# Patient Record
Sex: FEMALE | Race: WHITE | NOT HISPANIC OR LATINO
[De-identification: names, ages, dates, MRNs, and addresses within clinical notes are randomized per-mention and may not be internally consistent; named-entity substitution may affect disease eponyms.]

---

## 2018-08-22 ENCOUNTER — RESULT REVIEW (OUTPATIENT)
Age: 28
End: 2018-08-22

## 2020-03-09 ENCOUNTER — EMERGENCY (EMERGENCY)
Facility: HOSPITAL | Age: 30
LOS: 1 days | Discharge: HOME | End: 2020-03-09
Attending: EMERGENCY MEDICINE | Admitting: EMERGENCY MEDICINE
Payer: MEDICAID

## 2020-03-09 ENCOUNTER — TRANSCRIPTION ENCOUNTER (OUTPATIENT)
Age: 30
End: 2020-03-09

## 2020-03-09 VITALS
HEART RATE: 103 BPM | DIASTOLIC BLOOD PRESSURE: 88 MMHG | OXYGEN SATURATION: 100 % | SYSTOLIC BLOOD PRESSURE: 139 MMHG | TEMPERATURE: 98 F | RESPIRATION RATE: 18 BRPM

## 2020-03-09 VITALS
OXYGEN SATURATION: 100 % | HEART RATE: 99 BPM | SYSTOLIC BLOOD PRESSURE: 147 MMHG | RESPIRATION RATE: 18 BRPM | DIASTOLIC BLOOD PRESSURE: 89 MMHG | TEMPERATURE: 98 F

## 2020-03-09 LAB
ALBUMIN SERPL ELPH-MCNC: 4.4 G/DL — SIGNIFICANT CHANGE UP (ref 3.3–5)
ALP SERPL-CCNC: 56 U/L — SIGNIFICANT CHANGE UP (ref 40–120)
ALT FLD-CCNC: 14 U/L — SIGNIFICANT CHANGE UP (ref 4–33)
ANION GAP SERPL CALC-SCNC: 14 MMO/L — SIGNIFICANT CHANGE UP (ref 7–14)
APPEARANCE UR: CLEAR — SIGNIFICANT CHANGE UP
AST SERPL-CCNC: 12 U/L — SIGNIFICANT CHANGE UP (ref 4–32)
BACTERIA # UR AUTO: SIGNIFICANT CHANGE UP
BASOPHILS # BLD AUTO: 0.04 K/UL — SIGNIFICANT CHANGE UP (ref 0–0.2)
BASOPHILS NFR BLD AUTO: 0.5 % — SIGNIFICANT CHANGE UP (ref 0–2)
BILIRUB SERPL-MCNC: 1.1 MG/DL — SIGNIFICANT CHANGE UP (ref 0.2–1.2)
BILIRUB UR-MCNC: NEGATIVE — SIGNIFICANT CHANGE UP
BLD GP AB SCN SERPL QL: NEGATIVE — SIGNIFICANT CHANGE UP
BLOOD UR QL VISUAL: NEGATIVE — SIGNIFICANT CHANGE UP
BUN SERPL-MCNC: 11 MG/DL — SIGNIFICANT CHANGE UP (ref 7–23)
CALCIUM SERPL-MCNC: 9.6 MG/DL — SIGNIFICANT CHANGE UP (ref 8.4–10.5)
CHLORIDE SERPL-SCNC: 101 MMOL/L — SIGNIFICANT CHANGE UP (ref 98–107)
CO2 SERPL-SCNC: 22 MMOL/L — SIGNIFICANT CHANGE UP (ref 22–31)
COLOR SPEC: SIGNIFICANT CHANGE UP
CREAT SERPL-MCNC: 0.56 MG/DL — SIGNIFICANT CHANGE UP (ref 0.5–1.3)
EOSINOPHIL # BLD AUTO: 0.44 K/UL — SIGNIFICANT CHANGE UP (ref 0–0.5)
EOSINOPHIL NFR BLD AUTO: 5.1 % — SIGNIFICANT CHANGE UP (ref 0–6)
GLUCOSE SERPL-MCNC: 104 MG/DL — HIGH (ref 70–99)
GLUCOSE UR-MCNC: NEGATIVE — SIGNIFICANT CHANGE UP
HCG SERPL-ACNC: 114.5 MIU/ML — SIGNIFICANT CHANGE UP
HCT VFR BLD CALC: 44.9 % — SIGNIFICANT CHANGE UP (ref 34.5–45)
HGB BLD-MCNC: 14.1 G/DL — SIGNIFICANT CHANGE UP (ref 11.5–15.5)
HYALINE CASTS # UR AUTO: NEGATIVE — SIGNIFICANT CHANGE UP
IMM GRANULOCYTES NFR BLD AUTO: 0.3 % — SIGNIFICANT CHANGE UP (ref 0–1.5)
KETONES UR-MCNC: NEGATIVE — SIGNIFICANT CHANGE UP
LEUKOCYTE ESTERASE UR-ACNC: SIGNIFICANT CHANGE UP
LYMPHOCYTES # BLD AUTO: 3.44 K/UL — HIGH (ref 1–3.3)
LYMPHOCYTES # BLD AUTO: 40 % — SIGNIFICANT CHANGE UP (ref 13–44)
MCHC RBC-ENTMCNC: 26.9 PG — LOW (ref 27–34)
MCHC RBC-ENTMCNC: 31.4 % — LOW (ref 32–36)
MCV RBC AUTO: 85.5 FL — SIGNIFICANT CHANGE UP (ref 80–100)
MONOCYTES # BLD AUTO: 0.62 K/UL — SIGNIFICANT CHANGE UP (ref 0–0.9)
MONOCYTES NFR BLD AUTO: 7.2 % — SIGNIFICANT CHANGE UP (ref 2–14)
NEUTROPHILS # BLD AUTO: 4.02 K/UL — SIGNIFICANT CHANGE UP (ref 1.8–7.4)
NEUTROPHILS NFR BLD AUTO: 46.9 % — SIGNIFICANT CHANGE UP (ref 43–77)
NITRITE UR-MCNC: NEGATIVE — SIGNIFICANT CHANGE UP
NRBC # FLD: 0 K/UL — SIGNIFICANT CHANGE UP (ref 0–0)
PH UR: 7 — SIGNIFICANT CHANGE UP (ref 5–8)
PLATELET # BLD AUTO: 308 K/UL — SIGNIFICANT CHANGE UP (ref 150–400)
PMV BLD: 10 FL — SIGNIFICANT CHANGE UP (ref 7–13)
POTASSIUM SERPL-MCNC: 3.9 MMOL/L — SIGNIFICANT CHANGE UP (ref 3.5–5.3)
POTASSIUM SERPL-SCNC: 3.9 MMOL/L — SIGNIFICANT CHANGE UP (ref 3.5–5.3)
PROT SERPL-MCNC: 7.5 G/DL — SIGNIFICANT CHANGE UP (ref 6–8.3)
PROT UR-MCNC: NEGATIVE — SIGNIFICANT CHANGE UP
RBC # BLD: 5.25 M/UL — HIGH (ref 3.8–5.2)
RBC # FLD: 12.4 % — SIGNIFICANT CHANGE UP (ref 10.3–14.5)
RBC CASTS # UR COMP ASSIST: SIGNIFICANT CHANGE UP (ref 0–?)
RH IG SCN BLD-IMP: POSITIVE — SIGNIFICANT CHANGE UP
SODIUM SERPL-SCNC: 137 MMOL/L — SIGNIFICANT CHANGE UP (ref 135–145)
SP GR SPEC: 1.01 — SIGNIFICANT CHANGE UP (ref 1–1.04)
SQUAMOUS # UR AUTO: SIGNIFICANT CHANGE UP
UROBILINOGEN FLD QL: NORMAL — SIGNIFICANT CHANGE UP
WBC # BLD: 8.59 K/UL — SIGNIFICANT CHANGE UP (ref 3.8–10.5)
WBC # FLD AUTO: 8.59 K/UL — SIGNIFICANT CHANGE UP (ref 3.8–10.5)
WBC UR QL: SIGNIFICANT CHANGE UP (ref 0–?)

## 2020-03-09 PROCEDURE — 99284 EMERGENCY DEPT VISIT MOD MDM: CPT

## 2020-03-09 PROCEDURE — 76830 TRANSVAGINAL US NON-OB: CPT | Mod: 26

## 2020-03-09 RX ORDER — ACETAMINOPHEN 500 MG
650 TABLET ORAL ONCE
Refills: 0 | Status: COMPLETED | OUTPATIENT
Start: 2020-03-09 | End: 2020-03-09

## 2020-03-09 NOTE — ED PROVIDER NOTE - CLINICAL SUMMARY MEDICAL DECISION MAKING FREE TEXT BOX
Cabot: 30F with PMH of DM and PCOS, here with 2 weeks of abd bloating, nausea, and 1 episode of NBNB vomiting a few days ago.  No F/C/N/V/D/urinary sx/vaginal bleeding or discharge.  LMP early Feb. Normale exam.  Upreg positive.  Early preg vs ectopic.  Will check labs, b-hcg, TVUS, UA, reassess.

## 2020-03-09 NOTE — ED PROVIDER NOTE - OBJECTIVE STATEMENT
Cabot: 30F with PMH of DM and PCOS, here with 2 weeks of abd bloating, nausea, and 1 episode of NBNB vomiting a few days ago.  No F/C/N/V/D/urinary sx/vaginal bleeding or discharge.  LMP early Feb.

## 2020-03-09 NOTE — ED PROVIDER NOTE - RAPID ASSESSMENT
29 y/o F presents to ED with bloating and nausea since 2 weeks. Symptoms are constant and worsening. Reports 1 episode of vomiting several days ago, but has tolerate PO since then. Denies having any fever, chills, headache, chest pain, SOB, diarrhea, dysuria, and sick contacts. LMP early February and patient has had unprotected sex x1.   PE: GEN - NAD; well appearing; A+O x3, anxious appearing   HEAD - NC/AT   EYES- PERRL, EOMI  ENT: Airway patent, mmm, Oral cavity and pharynx normal. No inflammation, swelling, exudate, or lesions.  NECK: Neck supple, non-tender without lymphadenopathy, no masses.  PULMONARY - CTA b/l, symmetric breath sounds.   CARDIAC -s1s2, RRR, no M,G,R  ABDOMEN - +BS, Distension ?, NT, soft, no guarding, no rebound, no masses   BACK - no CVA tenderness, Normal  spine   EXTREMITIES - FROM, symmetric pulses, capillary refill < 2 seconds, no edema   SKIN - no rash or bruising   Initial face to face assessment completed by . Pregnancy test positive in ED, will check labs and US . Will hand off patient to intake providers for further evaluation and management. MD Guerrero: I briefly evaluated the patient in quick look triage as per [facility] ED procedure.   I have placed initial orders to help expedite the care.  I have handed off the care of this patient to the accepting ED provider team and signed out my initial brief assessment and orders to the team. The patient is to be fully evaluated by the intake providers and have orders confirmed/changed/added to as they see fit.  31 y/o F presents to ED with bloating and nausea since 2 weeks. Symptoms are constant and worsening. Reports 1 episode of vomiting several days ago, but has tolerate PO since then. Denies having any fever, chills, headache, chest pain, SOB, diarrhea, dysuria, and sick contacts. LMP early February and patient has had unprotected sex x1.   PE: GEN - NAD; well appearing; A+O x3, anxious appearing   HEAD - NC/AT   EYES- PERRL, EOMI  ENT: Airway patent, mmm, Oral cavity and pharynx normal. No inflammation, swelling, exudate, or lesions.  NECK: Neck supple, non-tender without lymphadenopathy, no masses.  PULMONARY - CTA b/l, symmetric breath sounds.   CARDIAC -s1s2, RRR, no M,G,R  ABDOMEN - +BS, ?mild distention, NT, soft, no guarding, no rebound, no masses   BACK - no CVA tenderness, Normal  spine   EXTREMITIES - FROM, symmetric pulses, capillary refill < 2 seconds, no edema   SKIN - no rash or bruising   Initial face to face assessment completed by . Pregnancy test positive in ED, will check labs and US . Will hand off patient to intake providers for further evaluation and management.

## 2020-03-09 NOTE — CONSULT NOTE ADULT - SUBJECTIVE AND OBJECTIVE BOX
GARO MCCULLOUGH  30y  Female [id number]    HPI: 29yo  LMP 2/8 p/w bloating x 1-2 weeks. Patient has female partner but had intercourse with a male  to try to conceive. She denies any abdominal pain, or VB. She has no complaints other than feeling bloated. Periods are irregular due to h.o PCOS. Denies any fevers, chills, N/V/D, SOB, CP, dyspnea.    Name of GYN Physician:  appt to establish care with Dr. Lazo 3/17    POB:   x1  c/b GDM    Pgyn: +PCOS. Denies fibroids, endometriosis, STI's, Abnormal pap smears     Home meds: metformin 2000mg daily, BP med (name unknown)    Allergies    cephalexin (Unknown)  oral contraceptives (Anaphylaxis)    PAST MEDICAL & SURGICAL HISTORY:  PCOS (polycystic ovarian syndrome)  Diabetes  No significant past surgical history      FAMILY HISTORY: HTN, CHF, T2DM in father, HTN in mother    Social History:  Denies current smoking use, drug use, alcohol use. H/o smoking, last cigarette 1-2 months ago    Vital Signs Last 24 Hrs  T(C): 36.8 (09 Mar 2020 16:24), Max: 36.8 (09 Mar 2020 16:24)  T(F): 98.3 (09 Mar 2020 16:24), Max: 98.3 (09 Mar 2020 16:24)  HR: 99 (09 Mar 2020 16:24) (99 - 103)  BP: 147/89 (09 Mar 2020 16:24) (139/88 - 147/89)  BP(mean): --  RR: 18 (09 Mar 2020 16:24) (18 - 18)  SpO2: 100% (09 Mar 2020 16:24) (100% - 100%)    Physical Exam:   General: sitting comftorably in bed, NAD   HEENT: neck supple, full ROM  CV: RR S1S2 no m/r/g  Lungs: CTA b/l, good air flow b/l   Back: No CVA tenderness  Abd: Soft, non-tender, non-distended.  Bowel sounds present.    :  No bleeding on pad.    External labia wnl.  Bimanual exam with no cervical motion tenderness, uterus wnl, adnexa non palpable b/l.  Cervix closed vs. Cervix dilated    cm   Speculum Exam: No active bleeding from os.  Physiologic discharge.    Ext: non-tender b/l, no edema     LABS:                              14.1   8.59  )-----------( 308      ( 09 Mar 2020 10:45 )             44.9         137  |  101  |  11  ----------------------------<  104<H>  3.9   |  22  |  0.56    Ca    9.6      09 Mar 2020 10:45    TPro  7.5  /  Alb  4.4  /  TBili  1.1  /  DBili  x   /  AST  12  /  ALT  14  /  AlkPhos  56      I&O's Detail      Urinalysis Basic - ( 09 Mar 2020 10:45 )    Color: LIGHT YELLOW / Appearance: CLEAR / S.011 / pH: 7.0  Gluc: NEGATIVE / Ketone: NEGATIVE  / Bili: NEGATIVE / Urobili: NORMAL   Blood: NEGATIVE / Protein: NEGATIVE / Nitrite: NEGATIVE   Leuk Esterase: MODERATE / RBC: 0-2 / WBC 3-5   Sq Epi: OCC / Non Sq Epi: x / Bacteria: FEW        RADIOLOGY & ADDITIONAL STUDIES:    EXAM:  US TRANSVAGINAL        PROCEDURE DATE:  Mar  9 2020         INTERPRETATION:  CLINICAL INFORMATION: Pregnant with bloating.    LMP: 2020.  Estimated Gestational Age by LMP: 4 weeks, 2 days.    COMPARISON: None available.    TECHNIQUE: Endovaginal pelvic sonogram.     FINDINGS:    Uterus: Uterus measures 7.3 x 4.5 x 6.2 cm. Endometrial stripe is thickened, measuring 1.5 cm. No intrauterine gestational sac is present, possibly due to early dates.    Right ovary: 4.2 x 2.2 x 2.2 cm. Corpus luteum, measuring 1.8 x 2.2 x 1.6 cm.  Left ovary: 2.5 x 2.0 x 1.4 cm. Within normal limits.  Free fluid: Small volume free fluid in the right adnexa and cul-de-sac.    IMPRESSION:    Positive beta hCG without intrauterine gestational sac or suspicious adnexal mass. Differential diagnosis includes early intrauterine pregnancy, occult ectopic pregnancy and completed spontaneous . Close follow-up with ultrasound and beta hCG levels is necessary to exclude ectopic pregnancy.                   SHANE PHELAN M.D., ATTENDING RADIOLOGIST  This document has been electronically signed. Mar  9 2020  4:01PM GARO MCCULLOUGH  30y  Female [id number]    HPI: 31yo  LMP 2/8 p/w bloating x 1-2 weeks. Patient has female partner but had intercourse with a male  to try to conceive. She denies any abdominal pain, or VB. She has no complaints other than feeling bloated. Periods are irregular due to h.o PCOS. Denies any fevers, chills, N/V/D, SOB, CP, dyspnea.    Name of GYN Physician:  appt to establish care with Dr. Lazo 3/17    POB:   x1  c/b GDM    Pgyn: +PCOS. Denies fibroids, endometriosis, STI's, Abnormal pap smears     Home meds: metformin 2000mg daily  Allergies    cephalexin (Unknown)  oral contraceptives (Anaphylaxis)    PAST MEDICAL & SURGICAL HISTORY:  PCOS (polycystic ovarian syndrome)  Diabetes  No significant past surgical history      FAMILY HISTORY: HTN, CHF, T2DM in father, HTN in mother    Social History:  Denies current smoking use, drug use, alcohol use. H/o smoking, last cigarette 1-2 months ago    Vital Signs Last 24 Hrs  T(C): 36.8 (09 Mar 2020 16:24), Max: 36.8 (09 Mar 2020 16:24)  T(F): 98.3 (09 Mar 2020 16:24), Max: 98.3 (09 Mar 2020 16:24)  HR: 99 (09 Mar 2020 16:24) (99 - 103)  BP: 147/89 (09 Mar 2020 16:24) (139/88 - 147/89)  BP(mean): --  RR: 18 (09 Mar 2020 16:24) (18 - 18)  SpO2: 100% (09 Mar 2020 16:24) (100% - 100%)    Physical Exam:   General: sitting comftorably in bed, NAD   HEENT: neck supple, full ROM  CV: RR S1S2 no m/r/g  Lungs: CTA b/l, good air flow b/l   Back: No CVA tenderness  Abd: Soft, non-tender, non-distended.  Bowel sounds present.    :  No bleeding on pad.    External labia wnl.  Bimanual exam with no cervical motion tenderness, uterus wnl, adnexa non palpable b/l.  Cervix closed vs. Cervix dilated    cm   Speculum Exam: No active bleeding from os.  Physiologic discharge.    Ext: non-tender b/l, no edema     LABS:                              14.1   8.59  )-----------( 308      ( 09 Mar 2020 10:45 )             44.9     -    137  |  101  |  11  ----------------------------<  104<H>  3.9   |  22  |  0.56    Ca    9.6      09 Mar 2020 10:45    TPro  7.5  /  Alb  4.4  /  TBili  1.1  /  DBili  x   /  AST  12  /  ALT  14  /  AlkPhos  56      I&O's Detail      Urinalysis Basic - ( 09 Mar 2020 10:45 )    Color: LIGHT YELLOW / Appearance: CLEAR / S.011 / pH: 7.0  Gluc: NEGATIVE / Ketone: NEGATIVE  / Bili: NEGATIVE / Urobili: NORMAL   Blood: NEGATIVE / Protein: NEGATIVE / Nitrite: NEGATIVE   Leuk Esterase: MODERATE / RBC: 0-2 / WBC 3-5   Sq Epi: OCC / Non Sq Epi: x / Bacteria: FEW        RADIOLOGY & ADDITIONAL STUDIES:    EXAM:  US TRANSVAGINAL        PROCEDURE DATE:  Mar  9 2020         INTERPRETATION:  CLINICAL INFORMATION: Pregnant with bloating.    LMP: 2020.  Estimated Gestational Age by LMP: 4 weeks, 2 days.    COMPARISON: None available.    TECHNIQUE: Endovaginal pelvic sonogram.     FINDINGS:    Uterus: Uterus measures 7.3 x 4.5 x 6.2 cm. Endometrial stripe is thickened, measuring 1.5 cm. No intrauterine gestational sac is present, possibly due to early dates.    Right ovary: 4.2 x 2.2 x 2.2 cm. Corpus luteum, measuring 1.8 x 2.2 x 1.6 cm.  Left ovary: 2.5 x 2.0 x 1.4 cm. Within normal limits.  Free fluid: Small volume free fluid in the right adnexa and cul-de-sac.    IMPRESSION:    Positive beta hCG without intrauterine gestational sac or suspicious adnexal mass. Differential diagnosis includes early intrauterine pregnancy, occult ectopic pregnancy and completed spontaneous . Close follow-up with ultrasound and beta hCG levels is necessary to exclude ectopic pregnancy.                   SHANE PHELAN M.D., ATTENDING RADIOLOGIST  This document has been electronically signed. Mar  9 2020  4:01PM

## 2020-03-09 NOTE — CONSULT NOTE ADULT - ASSESSMENT
A/P: 29yo  LMP 2/8 p/w bloating, found to have bHCG 114.5. TVUS revealed mild free fluid and R CL cyst. Physical exam asymptomatic    - T+S pending  - repeat bHCG in 48h  - Dr. Lazo aware of patient; patient instructed to contact his office for an earlier visit this Wed 3/11  - if patient unable to be seen by Dr. Lazo, return to ED for repeat beta  - ectopic precautions given    YESSI Cr PGY2  seen with Dr. Goss

## 2020-03-09 NOTE — ED PROVIDER NOTE - NSFOLLOWUPINSTRUCTIONS_ED_ALL_ED_FT
You have been seen for pregnancy.  We did not see a pregnancy in the uterus, so it is EITHER too early on in the pregnancy, OR the pregnancy is in the wrong place.  It is VERY important that you have another ultrasound and blood test (beta-hcg) in 48 hours, on Wednesday, March 11.  Please call Dr. Zack Lazo TODAY to make an appointment for Wednesday.  If you are unable to make an appointment, please come back to the ED for the testing.  Come back to the ED before that if you develop pain, vaginal bleeding, or other concerns.

## 2020-03-09 NOTE — ED ADULT NURSE NOTE - OBJECTIVE STATEMENT
Pt recd in intake, c/o 2 weeks of abd pain and nausea. Pt had a positive UCG in ED. Pt c/o nausea and states she doesn't want the Tylenol, but also doesn't want nausea medications because she had a bad reaction to it in the past, but unsure which medication.

## 2020-03-09 NOTE — ED PROVIDER NOTE - NS_ ATTENDINGSCRIBEDETAILS _ED_A_ED_FT
The ratnaibkayy's documentation of the Rapid Assessment Note has been prepared under my (Dr. Guerrero) direction after being performed by myself.  I performed an initial evaluation of this patient on my own so as to begin their care.  The care of this patient included a secondary attending only after my Rapid Assessment was performed by myself.  Following my Rapid Assessment, the care of this patient was signed out to a secondary attending whose attestation is separate from mine.  I have personally reviewed the portions of the chart relevant to my initial evaluation.  I agree with the ratnaibkayy's documentation of the Rapid Assessment unless otherwise noted here in the chart.

## 2020-03-09 NOTE — ED PROVIDER NOTE - PATIENT PORTAL LINK FT
You can access the FollowMyHealth Patient Portal offered by [facility] by registering at the following website: [email or web address]. By joining [email or web address]’s FollowMyHealth portal, you will also be able to view your health information using other applications (apps) compatible with our system.

## 2020-03-11 NOTE — CHART NOTE - NSCHARTNOTEFT_GEN_A_CORE
Patient called and voicemail left instructing patient to follow up in the ED for repeat 48h bHCG tonight. She was initially found to have beta  of 114.5 on 3/11 after coming to the ED with a +HPT.  Contact information included in kristian.    YESSI Cr PGY2

## 2020-03-12 NOTE — CHART NOTE - NSCHARTNOTEFT_GEN_A_CORE
R1 communication note    Called and left voicemail reminding patient to follow up with her private OB or come to the ED for repeat bHCG. Return precautions reviewed. Contact phone number for the clinic was provided. Will continue to try and contact patient.    Rfrankel PGY1

## 2020-03-24 PROBLEM — Z00.00 ENCOUNTER FOR PREVENTIVE HEALTH EXAMINATION: Status: ACTIVE | Noted: 2020-03-24

## 2020-04-18 PROBLEM — E28.2 POLYCYSTIC OVARIAN SYNDROME: Chronic | Status: ACTIVE | Noted: 2020-03-09

## 2020-04-19 ENCOUNTER — TRANSCRIPTION ENCOUNTER (OUTPATIENT)
Age: 30
End: 2020-04-19

## 2020-04-20 ENCOUNTER — RESULT REVIEW (OUTPATIENT)
Age: 30
End: 2020-04-20

## 2020-04-20 ENCOUNTER — OUTPATIENT (OUTPATIENT)
Dept: OUTPATIENT SERVICES | Facility: HOSPITAL | Age: 30
LOS: 1 days | End: 2020-04-20
Payer: MEDICAID

## 2020-04-20 ENCOUNTER — OUTPATIENT (OUTPATIENT)
Dept: OUTPATIENT SERVICES | Facility: HOSPITAL | Age: 30
LOS: 1 days | Discharge: HOME | End: 2020-04-20
Payer: MEDICAID

## 2020-04-20 VITALS
SYSTOLIC BLOOD PRESSURE: 133 MMHG | TEMPERATURE: 99 F | DIASTOLIC BLOOD PRESSURE: 84 MMHG | HEART RATE: 100 BPM | RESPIRATION RATE: 15 BRPM | OXYGEN SATURATION: 100 %

## 2020-04-20 VITALS
WEIGHT: 179.9 LBS | RESPIRATION RATE: 14 BRPM | SYSTOLIC BLOOD PRESSURE: 128 MMHG | HEIGHT: 62 IN | DIASTOLIC BLOOD PRESSURE: 89 MMHG | TEMPERATURE: 99 F | OXYGEN SATURATION: 99 % | HEART RATE: 102 BPM

## 2020-04-20 DIAGNOSIS — O02.1 MISSED ABORTION: ICD-10-CM

## 2020-04-20 DIAGNOSIS — Z98.890 OTHER SPECIFIED POSTPROCEDURAL STATES: Chronic | ICD-10-CM

## 2020-04-20 LAB
APTT BLD: 32.4 SEC — SIGNIFICANT CHANGE UP (ref 27.5–36.3)
BASOPHILS # BLD AUTO: 0.05 K/UL — SIGNIFICANT CHANGE UP (ref 0–0.2)
BASOPHILS NFR BLD AUTO: 0.5 % — SIGNIFICANT CHANGE UP (ref 0–2)
BLD GP AB SCN SERPL QL: NEGATIVE — SIGNIFICANT CHANGE UP
EOSINOPHIL # BLD AUTO: 0.47 K/UL — SIGNIFICANT CHANGE UP (ref 0–0.5)
EOSINOPHIL NFR BLD AUTO: 5.1 % — SIGNIFICANT CHANGE UP (ref 0–6)
HCT VFR BLD CALC: 38 % — SIGNIFICANT CHANGE UP (ref 34.5–45)
HGB BLD-MCNC: 12.6 G/DL — SIGNIFICANT CHANGE UP (ref 11.5–15.5)
IMM GRANULOCYTES NFR BLD AUTO: 0.2 % — SIGNIFICANT CHANGE UP (ref 0–1.5)
INR BLD: 0.97 — SIGNIFICANT CHANGE UP (ref 0.88–1.17)
LYMPHOCYTES # BLD AUTO: 3.42 K/UL — HIGH (ref 1–3.3)
LYMPHOCYTES # BLD AUTO: 37.1 % — SIGNIFICANT CHANGE UP (ref 13–44)
MCHC RBC-ENTMCNC: 27.8 PG — SIGNIFICANT CHANGE UP (ref 27–34)
MCHC RBC-ENTMCNC: 33.2 % — SIGNIFICANT CHANGE UP (ref 32–36)
MCV RBC AUTO: 83.7 FL — SIGNIFICANT CHANGE UP (ref 80–100)
MONOCYTES # BLD AUTO: 0.63 K/UL — SIGNIFICANT CHANGE UP (ref 0–0.9)
MONOCYTES NFR BLD AUTO: 6.8 % — SIGNIFICANT CHANGE UP (ref 2–14)
NEUTROPHILS # BLD AUTO: 4.62 K/UL — SIGNIFICANT CHANGE UP (ref 1.8–7.4)
NEUTROPHILS NFR BLD AUTO: 50.3 % — SIGNIFICANT CHANGE UP (ref 43–77)
NRBC # FLD: 0 K/UL — SIGNIFICANT CHANGE UP (ref 0–0)
PLATELET # BLD AUTO: 247 K/UL — SIGNIFICANT CHANGE UP (ref 150–400)
PMV BLD: 9.4 FL — SIGNIFICANT CHANGE UP (ref 7–13)
PROTHROM AB SERPL-ACNC: 11 SEC — SIGNIFICANT CHANGE UP (ref 9.8–13.1)
RBC # BLD: 4.54 M/UL — SIGNIFICANT CHANGE UP (ref 3.8–5.2)
RBC # FLD: 12.6 % — SIGNIFICANT CHANGE UP (ref 10.3–14.5)
RH IG SCN BLD-IMP: POSITIVE — SIGNIFICANT CHANGE UP
WBC # BLD: 9.21 K/UL — SIGNIFICANT CHANGE UP (ref 3.8–10.5)
WBC # FLD AUTO: 9.21 K/UL — SIGNIFICANT CHANGE UP (ref 3.8–10.5)

## 2020-04-20 PROCEDURE — 88264 CHROMOSOME ANALYSIS 20-25: CPT

## 2020-04-20 PROCEDURE — 88233 TISSUE CULTURE SKIN/BIOPSY: CPT

## 2020-04-20 PROCEDURE — 88280 CHROMOSOME KARYOTYPE STUDY: CPT

## 2020-04-20 PROCEDURE — 88305 TISSUE EXAM BY PATHOLOGIST: CPT | Mod: 26

## 2020-04-20 RX ORDER — SODIUM CHLORIDE 9 MG/ML
1000 INJECTION, SOLUTION INTRAVENOUS
Refills: 0 | Status: DISCONTINUED | OUTPATIENT
Start: 2020-04-20 | End: 2020-05-05

## 2020-04-20 NOTE — H&P PST ADULT - HISTORY OF PRESENT ILLNESS
30y  LMP 20, presents for a scheduled DVC for a missed . Patient had an US last Thursday, which demonstrated the fetus without cardiac activity, which was previously seen. She states the fetus at that time was measured to be approximately 9weeks. Denies fever/chills n/v, CP/SOB, vaginal bleeding, abdominal/pelvic pain.     COVID-19 negative on 20.     OB/GYN: Dr. Lazo

## 2020-04-20 NOTE — ASU DISCHARGE PLAN (ADULT/PEDIATRIC) - CALL YOUR DOCTOR IF YOU HAVE ANY OF THE FOLLOWING:
Fever greater than (need to indicate Fahrenheit or Celsius)/Wound/Surgical Site with redness, or foul smelling discharge or pus/Nausea and vomiting that does not stop/Unable to urinate/Inability to tolerate liquids or foods/Bleeding that does not stop/Pain not relieved by Medications

## 2020-04-20 NOTE — H&P PST ADULT - NSICDXPASTMEDICALHX_GEN_ALL_CORE_FT
PAST MEDICAL HISTORY:   (normal spontaneous vaginal delivery)     PCOS (polycystic ovarian syndrome)     Pre-diabetes

## 2020-04-20 NOTE — ASU DISCHARGE PLAN (ADULT/PEDIATRIC) - CARE PROVIDER_API CALL
Cal Lazo)  Obstetrics and Gynecology  [address] [place] [address]  Phone: [phone]  Fax: [phone]  Established Patient  Follow Up Time: 2 weeks

## 2020-04-20 NOTE — ASU DISCHARGE PLAN (ADULT/PEDIATRIC) - ACTIVITY LEVEL
Weight bearing as tolerated/No tub baths/No excercise/No douching/No heavy lifting/No sports/gym/Nothing per vagina/No tampons/2 weeks/No intercourse

## 2020-04-20 NOTE — BRIEF OPERATIVE NOTE - OPERATION/FINDINGS
EUA: anteverted uterus, no adnexal masses palpable. Excellent hemostasis noted at the end of the procedure.

## 2020-04-20 NOTE — BRIEF OPERATIVE NOTE - NSICDXBRIEFPROCEDURE_GEN_ALL_CORE_FT
PROCEDURES:  Dilation and curettage, uterus, using suction, for missed first trimester  2020 13:13:38  Ya Mobley

## 2020-04-23 ENCOUNTER — TRANSCRIPTION ENCOUNTER (OUTPATIENT)
Age: 30
End: 2020-04-23

## 2020-04-28 PROBLEM — R73.03 PREDIABETES: Chronic | Status: ACTIVE | Noted: 2020-04-20

## 2020-05-02 ENCOUNTER — APPOINTMENT (OUTPATIENT)
Dept: ANTEPARTUM | Facility: CLINIC | Age: 30
End: 2020-05-02

## 2021-08-16 ENCOUNTER — OUTPATIENT (OUTPATIENT)
Dept: OUTPATIENT SERVICES | Facility: HOSPITAL | Age: 31
LOS: 1 days | End: 2021-08-16
Payer: MEDICAID

## 2021-08-16 ENCOUNTER — APPOINTMENT (OUTPATIENT)
Dept: CT IMAGING | Facility: IMAGING CENTER | Age: 31
End: 2021-08-16
Payer: MEDICAID

## 2021-08-16 ENCOUNTER — APPOINTMENT (OUTPATIENT)
Dept: UROLOGY | Facility: CLINIC | Age: 31
End: 2021-08-16
Payer: MEDICAID

## 2021-08-16 VITALS
WEIGHT: 170 LBS | HEIGHT: 62.5 IN | BODY MASS INDEX: 30.5 KG/M2 | RESPIRATION RATE: 17 BRPM | TEMPERATURE: 98 F | DIASTOLIC BLOOD PRESSURE: 92 MMHG | HEART RATE: 103 BPM | SYSTOLIC BLOOD PRESSURE: 134 MMHG

## 2021-08-16 DIAGNOSIS — R10.9 UNSPECIFIED ABDOMINAL PAIN: ICD-10-CM

## 2021-08-16 DIAGNOSIS — Z98.890 OTHER SPECIFIED POSTPROCEDURAL STATES: Chronic | ICD-10-CM

## 2021-08-16 PROCEDURE — 99203 OFFICE O/P NEW LOW 30 MIN: CPT

## 2021-08-16 PROCEDURE — 74176 CT ABD & PELVIS W/O CONTRAST: CPT | Mod: 26

## 2021-08-16 PROCEDURE — 74176 CT ABD & PELVIS W/O CONTRAST: CPT

## 2021-08-17 DIAGNOSIS — N20.0 CALCULUS OF KIDNEY: ICD-10-CM

## 2021-08-18 ENCOUNTER — NON-APPOINTMENT (OUTPATIENT)
Age: 31
End: 2021-08-18

## 2021-08-18 LAB — BACTERIA UR CULT: ABNORMAL

## 2021-08-18 RX ORDER — AMOXICILLIN AND CLAVULANATE POTASSIUM 875; 125 MG/1; MG/1
875-125 TABLET, COATED ORAL
Qty: 10 | Refills: 0 | Status: ACTIVE | COMMUNITY
Start: 2021-08-18 | End: 1900-01-01

## 2021-08-18 RX ORDER — SULFAMETHOXAZOLE AND TRIMETHOPRIM 800; 160 MG/1; MG/1
800-160 TABLET ORAL TWICE DAILY
Qty: 10 | Refills: 0 | Status: ACTIVE | COMMUNITY
Start: 2021-08-18 | End: 1900-01-01

## 2021-08-18 NOTE — PHYSICAL EXAM
[General Appearance - Well Developed] : well developed [General Appearance - Well Nourished] : well nourished [Heart Rate And Rhythm] : Heart rate and rhythm were normal [Respiration, Rhythm And Depth] : normal respiratory rhythm and effort [] : no respiratory distress [Bowel Sounds] : normal bowel sounds [Abdomen Soft] : soft [Normal Station and Gait] : the gait and station were normal for the patient's age [Skin Color & Pigmentation] : normal skin color and pigmentation [Skin Turgor] : supple [No Focal Deficits] : no focal deficits [Not Anxious] : not anxious [Oriented To Time, Place, And Person] : oriented to person, place, and time [No Palpable Adenopathy] : no palpable adenopathy [FreeTextEntry1] : mideline sacral pain, starts in flank and extends to sacrum

## 2021-08-18 NOTE — HISTORY OF PRESENT ILLNESS
[FreeTextEntry1] : HPI:  Ms. Brower is a 30 yo F with a PMHx of PCOS and new onset nephrolithiasis with a 1.3 cm stone noted on a KUB ordered by PCP. She presents today with several month history of shock-like electric pain. Her pain is radiated from the midline down to her gluteal muscles, occasionally can radiate from flank. She recently had a miscarriage but previously has a prior child.  Currently triying to have another child. \par \par Anticoagulation: None\par All: NKDA\par Social: , not working, dental assistant, 1 son\par PMHx: PCOS and DM metformin\par FHx: kidney failure by mom, father with kidney stones\par PSHx: D+C for the miscarriage, no abdominal surgeries or back surgeries\par  [Urinary Incontinence] : no urinary incontinence [Urinary Retention] : no urinary retention [Urinary Urgency] : no urinary urgency [Urinary Frequency] : no urinary frequency

## 2021-09-09 ENCOUNTER — APPOINTMENT (OUTPATIENT)
Dept: UROLOGY | Facility: CLINIC | Age: 31
End: 2021-09-09

## 2021-09-29 DIAGNOSIS — Z01.818 ENCOUNTER FOR OTHER PREPROCEDURAL EXAMINATION: ICD-10-CM

## 2021-09-30 RX ORDER — DIAZEPAM 2 MG/1
2 TABLET ORAL
Qty: 1 | Refills: 0 | Status: ACTIVE | COMMUNITY
Start: 2021-09-29 | End: 1900-01-01

## 2021-10-10 ENCOUNTER — OUTPATIENT (OUTPATIENT)
Dept: OUTPATIENT SERVICES | Facility: HOSPITAL | Age: 31
LOS: 1 days | End: 2021-10-10
Payer: MEDICAID

## 2021-10-10 ENCOUNTER — APPOINTMENT (OUTPATIENT)
Dept: MRI IMAGING | Facility: CLINIC | Age: 31
End: 2021-10-10
Payer: MEDICAID

## 2021-10-10 DIAGNOSIS — R10.9 UNSPECIFIED ABDOMINAL PAIN: ICD-10-CM

## 2021-10-10 DIAGNOSIS — Z98.890 OTHER SPECIFIED POSTPROCEDURAL STATES: Chronic | ICD-10-CM

## 2021-10-10 PROCEDURE — 72158 MRI LUMBAR SPINE W/O & W/DYE: CPT

## 2021-10-10 PROCEDURE — A9585: CPT

## 2021-10-10 PROCEDURE — 72158 MRI LUMBAR SPINE W/O & W/DYE: CPT | Mod: 26

## 2021-10-18 ENCOUNTER — NON-APPOINTMENT (OUTPATIENT)
Age: 31
End: 2021-10-18

## 2021-10-24 ENCOUNTER — NON-APPOINTMENT (OUTPATIENT)
Age: 31
End: 2021-10-24

## 2021-10-27 ENCOUNTER — APPOINTMENT (OUTPATIENT)
Dept: ORTHOPEDIC SURGERY | Facility: CLINIC | Age: 31
End: 2021-10-27

## 2021-11-08 ENCOUNTER — OUTPATIENT (OUTPATIENT)
Dept: OUTPATIENT SERVICES | Facility: HOSPITAL | Age: 31
LOS: 1 days | End: 2021-11-08
Payer: MEDICAID

## 2021-11-08 VITALS
WEIGHT: 179.9 LBS | HEART RATE: 98 BPM | TEMPERATURE: 98 F | DIASTOLIC BLOOD PRESSURE: 88 MMHG | SYSTOLIC BLOOD PRESSURE: 133 MMHG | HEIGHT: 62.5 IN | RESPIRATION RATE: 20 BRPM | OXYGEN SATURATION: 98 %

## 2021-11-08 DIAGNOSIS — Z98.890 OTHER SPECIFIED POSTPROCEDURAL STATES: Chronic | ICD-10-CM

## 2021-11-08 DIAGNOSIS — Z01.818 ENCOUNTER FOR OTHER PREPROCEDURAL EXAMINATION: ICD-10-CM

## 2021-11-08 DIAGNOSIS — R10.9 UNSPECIFIED ABDOMINAL PAIN: ICD-10-CM

## 2021-11-08 DIAGNOSIS — N20.0 CALCULUS OF KIDNEY: ICD-10-CM

## 2021-11-08 LAB
ANION GAP SERPL CALC-SCNC: 14 MMOL/L — SIGNIFICANT CHANGE UP (ref 5–17)
BUN SERPL-MCNC: 14 MG/DL — SIGNIFICANT CHANGE UP (ref 7–23)
CALCIUM SERPL-MCNC: 9.7 MG/DL — SIGNIFICANT CHANGE UP (ref 8.4–10.5)
CHLORIDE SERPL-SCNC: 101 MMOL/L — SIGNIFICANT CHANGE UP (ref 96–108)
CO2 SERPL-SCNC: 22 MMOL/L — SIGNIFICANT CHANGE UP (ref 22–31)
CREAT SERPL-MCNC: 0.65 MG/DL — SIGNIFICANT CHANGE UP (ref 0.5–1.3)
GLUCOSE SERPL-MCNC: 105 MG/DL — HIGH (ref 70–99)
HCT VFR BLD CALC: 41.3 % — SIGNIFICANT CHANGE UP (ref 34.5–45)
HGB BLD-MCNC: 13.5 G/DL — SIGNIFICANT CHANGE UP (ref 11.5–15.5)
MCHC RBC-ENTMCNC: 27.8 PG — SIGNIFICANT CHANGE UP (ref 27–34)
MCHC RBC-ENTMCNC: 32.7 GM/DL — SIGNIFICANT CHANGE UP (ref 32–36)
MCV RBC AUTO: 85.2 FL — SIGNIFICANT CHANGE UP (ref 80–100)
NRBC # BLD: 0 /100 WBCS — SIGNIFICANT CHANGE UP (ref 0–0)
PLATELET # BLD AUTO: 253 K/UL — SIGNIFICANT CHANGE UP (ref 150–400)
POTASSIUM SERPL-MCNC: 3.7 MMOL/L — SIGNIFICANT CHANGE UP (ref 3.5–5.3)
POTASSIUM SERPL-SCNC: 3.7 MMOL/L — SIGNIFICANT CHANGE UP (ref 3.5–5.3)
RBC # BLD: 4.85 M/UL — SIGNIFICANT CHANGE UP (ref 3.8–5.2)
RBC # FLD: 12 % — SIGNIFICANT CHANGE UP (ref 10.3–14.5)
SODIUM SERPL-SCNC: 137 MMOL/L — SIGNIFICANT CHANGE UP (ref 135–145)
WBC # BLD: 10.5 K/UL — SIGNIFICANT CHANGE UP (ref 3.8–10.5)
WBC # FLD AUTO: 10.5 K/UL — SIGNIFICANT CHANGE UP (ref 3.8–10.5)

## 2021-11-08 PROCEDURE — 83036 HEMOGLOBIN GLYCOSYLATED A1C: CPT

## 2021-11-08 PROCEDURE — 85027 COMPLETE CBC AUTOMATED: CPT

## 2021-11-08 PROCEDURE — 87086 URINE CULTURE/COLONY COUNT: CPT

## 2021-11-08 PROCEDURE — 87077 CULTURE AEROBIC IDENTIFY: CPT

## 2021-11-08 PROCEDURE — 80048 BASIC METABOLIC PNL TOTAL CA: CPT

## 2021-11-08 PROCEDURE — G0463: CPT

## 2021-11-08 RX ORDER — LIDOCAINE HCL 20 MG/ML
0.2 VIAL (ML) INJECTION ONCE
Refills: 0 | Status: DISCONTINUED | OUTPATIENT
Start: 2021-11-19 | End: 2021-12-03

## 2021-11-08 RX ORDER — CIPROFLOXACIN LACTATE 400MG/40ML
400 VIAL (ML) INTRAVENOUS ONCE
Refills: 0 | Status: DISCONTINUED | OUTPATIENT
Start: 2021-11-19 | End: 2021-12-03

## 2021-11-08 RX ORDER — METFORMIN HYDROCHLORIDE 850 MG/1
2 TABLET ORAL
Qty: 0 | Refills: 0 | DISCHARGE

## 2021-11-08 RX ORDER — ACETAMINOPHEN 500 MG
3 TABLET ORAL
Qty: 0 | Refills: 0 | DISCHARGE

## 2021-11-08 RX ORDER — IBUPROFEN 200 MG
3 TABLET ORAL
Qty: 0 | Refills: 0 | DISCHARGE

## 2021-11-08 NOTE — H&P PST ADULT - NSICDXPASTSURGICALHX_GEN_ALL_CORE_FT
PAST SURGICAL HISTORY:  H/O induced      S/P dilation and curettage missed  2020     PAST SURGICAL HISTORY:  S/P dilation and curettage missed  2020

## 2021-11-08 NOTE — H&P PST ADULT - HISTORY OF PRESENT ILLNESS
31 yr old female with history of left flank pain - radiating to sacrum- shooting pain - found to have a non obstructing  9 mm kidney stone at lower pole. Now coming in for Cystoscopy, Left ureteroscopy, laser lithotripsy. Stent placement on 11/19/2021.     Denies Recent travel, Exposure or Covid symptoms  Covid vaccine 2 doses - last dose-5/2021  Covid test- 11/16/2021

## 2021-11-08 NOTE — H&P PST ADULT - ATTENDING COMMENTS
Patient seen and examined, plan for cystoscopy, left ureteroscopy, laser lithotripsy and stent placement

## 2021-11-08 NOTE — H&P PST ADULT - NSICDXPASTMEDICALHX_GEN_ALL_CORE_FT
PAST MEDICAL HISTORY:  DDD (degenerative disc disease), lumbar     PCOS (polycystic ovarian syndrome)     Pre-diabetes     Renal calculi left- non obstructing     PAST MEDICAL HISTORY:  DDD (degenerative disc disease), lumbar     Obesity     PCOS (polycystic ovarian syndrome)     Pre-diabetes     Renal calculi left- non obstructing

## 2021-11-09 LAB
A1C WITH ESTIMATED AVERAGE GLUCOSE RESULT: 5.3 % — SIGNIFICANT CHANGE UP (ref 4–5.6)
ESTIMATED AVERAGE GLUCOSE: 105 MG/DL — SIGNIFICANT CHANGE UP (ref 68–114)

## 2021-11-10 PROBLEM — M51.36 OTHER INTERVERTEBRAL DISC DEGENERATION, LUMBAR REGION: Chronic | Status: ACTIVE | Noted: 2021-11-08

## 2021-11-10 PROBLEM — E66.9 OBESITY, UNSPECIFIED: Chronic | Status: ACTIVE | Noted: 2021-11-08

## 2021-11-10 PROBLEM — N20.0 CALCULUS OF KIDNEY: Chronic | Status: ACTIVE | Noted: 2021-11-08

## 2021-11-10 LAB
CULTURE RESULTS: SIGNIFICANT CHANGE UP
SPECIMEN SOURCE: SIGNIFICANT CHANGE UP

## 2021-11-10 RX ORDER — SULFAMETHOXAZOLE AND TRIMETHOPRIM 800; 160 MG/1; MG/1
800-160 TABLET ORAL TWICE DAILY
Qty: 10 | Refills: 0 | Status: ACTIVE | COMMUNITY
Start: 2021-11-10 | End: 1900-01-01

## 2021-11-15 DIAGNOSIS — Z01.818 ENCOUNTER FOR OTHER PREPROCEDURAL EXAMINATION: ICD-10-CM

## 2021-11-16 ENCOUNTER — APPOINTMENT (OUTPATIENT)
Dept: DISASTER EMERGENCY | Facility: CLINIC | Age: 31
End: 2021-11-16

## 2021-11-17 LAB — SARS-COV-2 N GENE NPH QL NAA+PROBE: NOT DETECTED

## 2021-11-18 ENCOUNTER — TRANSCRIPTION ENCOUNTER (OUTPATIENT)
Age: 31
End: 2021-11-18

## 2021-11-19 ENCOUNTER — APPOINTMENT (OUTPATIENT)
Dept: UROLOGY | Facility: HOSPITAL | Age: 31
End: 2021-11-19

## 2021-11-19 ENCOUNTER — OUTPATIENT (OUTPATIENT)
Dept: OUTPATIENT SERVICES | Facility: HOSPITAL | Age: 31
LOS: 1 days | End: 2021-11-19
Payer: MEDICAID

## 2021-11-19 ENCOUNTER — RESULT REVIEW (OUTPATIENT)
Age: 31
End: 2021-11-19

## 2021-11-19 VITALS
SYSTOLIC BLOOD PRESSURE: 145 MMHG | HEART RATE: 100 BPM | RESPIRATION RATE: 16 BRPM | OXYGEN SATURATION: 100 % | DIASTOLIC BLOOD PRESSURE: 96 MMHG | TEMPERATURE: 97 F

## 2021-11-19 VITALS
HEART RATE: 94 BPM | WEIGHT: 179.9 LBS | SYSTOLIC BLOOD PRESSURE: 128 MMHG | DIASTOLIC BLOOD PRESSURE: 83 MMHG | TEMPERATURE: 98 F | RESPIRATION RATE: 16 BRPM | OXYGEN SATURATION: 98 % | HEIGHT: 62 IN

## 2021-11-19 DIAGNOSIS — N20.1 CALCULUS OF URETER: ICD-10-CM

## 2021-11-19 DIAGNOSIS — Z98.890 OTHER SPECIFIED POSTPROCEDURAL STATES: Chronic | ICD-10-CM

## 2021-11-19 DIAGNOSIS — R10.9 UNSPECIFIED ABDOMINAL PAIN: ICD-10-CM

## 2021-11-19 DIAGNOSIS — N20.0 CALCULUS OF KIDNEY: ICD-10-CM

## 2021-11-19 LAB — HCG UR QL: NEGATIVE — SIGNIFICANT CHANGE UP

## 2021-11-19 PROCEDURE — 88300 SURGICAL PATH GROSS: CPT | Mod: 26

## 2021-11-19 PROCEDURE — 52356 CYSTO/URETERO W/LITHOTRIPSY: CPT | Mod: LT

## 2021-11-19 PROCEDURE — 74420 UROGRAPHY RTRGR +-KUB: CPT | Mod: 26

## 2021-11-19 RX ORDER — TAMSULOSIN HYDROCHLORIDE 0.4 MG/1
0.4 CAPSULE ORAL AT BEDTIME
Refills: 0 | Status: DISCONTINUED | OUTPATIENT
Start: 2021-11-19 | End: 2021-11-19

## 2021-11-19 RX ORDER — OXYCODONE HYDROCHLORIDE 5 MG/1
10 TABLET ORAL EVERY 6 HOURS
Refills: 0 | Status: DISCONTINUED | OUTPATIENT
Start: 2021-11-19 | End: 2021-11-19

## 2021-11-19 RX ORDER — ACETAMINOPHEN 500 MG
975 TABLET ORAL EVERY 6 HOURS
Refills: 0 | Status: DISCONTINUED | OUTPATIENT
Start: 2021-11-19 | End: 2021-12-03

## 2021-11-19 RX ORDER — OXYCODONE HYDROCHLORIDE 5 MG/1
1 TABLET ORAL
Qty: 12 | Refills: 0
Start: 2021-11-19 | End: 2021-11-21

## 2021-11-19 RX ORDER — PHENAZOPYRIDINE HCL 100 MG
200 TABLET ORAL THREE TIMES A DAY
Refills: 0 | Status: DISCONTINUED | OUTPATIENT
Start: 2021-11-19 | End: 2021-12-03

## 2021-11-19 RX ORDER — ONDANSETRON 8 MG/1
4 TABLET, FILM COATED ORAL ONCE
Refills: 0 | Status: DISCONTINUED | OUTPATIENT
Start: 2021-11-19 | End: 2021-11-19

## 2021-11-19 RX ORDER — PHENAZOPYRIDINE HCL 100 MG
200 TABLET ORAL THREE TIMES A DAY
Refills: 0 | Status: DISCONTINUED | OUTPATIENT
Start: 2021-11-19 | End: 2021-11-19

## 2021-11-19 RX ORDER — TAMSULOSIN HYDROCHLORIDE 0.4 MG/1
0.4 CAPSULE ORAL ONCE
Refills: 0 | Status: COMPLETED | OUTPATIENT
Start: 2021-11-19 | End: 2021-11-19

## 2021-11-19 RX ORDER — OXYCODONE HYDROCHLORIDE 5 MG/1
5 TABLET ORAL EVERY 4 HOURS
Refills: 0 | Status: DISCONTINUED | OUTPATIENT
Start: 2021-11-19 | End: 2021-11-19

## 2021-11-19 RX ORDER — TAMSULOSIN HYDROCHLORIDE 0.4 MG/1
1 CAPSULE ORAL
Qty: 30 | Refills: 0
Start: 2021-11-19 | End: 2021-12-18

## 2021-11-19 RX ORDER — SODIUM CHLORIDE 9 MG/ML
3 INJECTION INTRAMUSCULAR; INTRAVENOUS; SUBCUTANEOUS EVERY 8 HOURS
Refills: 0 | Status: DISCONTINUED | OUTPATIENT
Start: 2021-11-19 | End: 2021-11-19

## 2021-11-19 RX ORDER — PHENAZOPYRIDINE HCL 100 MG
1 TABLET ORAL
Qty: 6 | Refills: 0
Start: 2021-11-19 | End: 2021-11-20

## 2021-11-19 RX ORDER — DIAZEPAM 5 MG
5 TABLET ORAL ONCE
Refills: 0 | Status: DISCONTINUED | OUTPATIENT
Start: 2021-11-19 | End: 2021-11-19

## 2021-11-19 RX ORDER — FENTANYL CITRATE 50 UG/ML
50 INJECTION INTRAVENOUS
Refills: 0 | Status: DISCONTINUED | OUTPATIENT
Start: 2021-11-19 | End: 2021-11-19

## 2021-11-19 RX ORDER — ACETAMINOPHEN 500 MG
3 TABLET ORAL
Qty: 0 | Refills: 0 | DISCHARGE
Start: 2021-11-19

## 2021-11-19 RX ADMIN — Medication 975 MILLIGRAM(S): at 17:59

## 2021-11-19 RX ADMIN — FENTANYL CITRATE 50 MICROGRAM(S): 50 INJECTION INTRAVENOUS at 14:06

## 2021-11-19 RX ADMIN — Medication 200 MILLIGRAM(S): at 17:59

## 2021-11-19 RX ADMIN — OXYCODONE HYDROCHLORIDE 10 MILLIGRAM(S): 5 TABLET ORAL at 16:39

## 2021-11-19 RX ADMIN — Medication 5 MILLIGRAM(S): at 15:15

## 2021-11-19 RX ADMIN — OXYCODONE HYDROCHLORIDE 10 MILLIGRAM(S): 5 TABLET ORAL at 17:22

## 2021-11-19 RX ADMIN — TAMSULOSIN HYDROCHLORIDE 0.4 MILLIGRAM(S): 0.4 CAPSULE ORAL at 17:59

## 2021-11-19 RX ADMIN — FENTANYL CITRATE 50 MICROGRAM(S): 50 INJECTION INTRAVENOUS at 14:30

## 2021-11-19 NOTE — ASU DISCHARGE PLAN (ADULT/PEDIATRIC) - CARE PROVIDER_API CALL
Sunny Cervantes)  Urology  [facility] - Dept of Urology, [address] [place] [address]  Phone: [phone]  Fax: [phone]  Follow Up Time: 2 weeks

## 2021-11-19 NOTE — PRE-ANESTHESIA EVALUATION ADULT - NSANTHPMHFT_GEN_ALL_CORE
31F with PCOS, pre-DM, lumbar disc bulge with radiculopathy bilaterally, current 1 cigarette per day smoker and left kidney stone.

## 2021-11-19 NOTE — PRE-ANESTHESIA EVALUATION ADULT - NSANTHNECKRD_ENT_A_CORE
Upon review of the In Basket request and the patient's chart, initial outreach has been made via fax, please see Contacts section for details     A second outreach attempt will be made within 3 business days   [phone]     [facility]   Thank you  Abhijeet Friedman MA No

## 2021-11-19 NOTE — ASU DISCHARGE PLAN (ADULT/PEDIATRIC) - ASU DC SPECIAL INSTRUCTIONSFT
Discharge Instructions: Ureteroscopy    · Stent: You have an internal stent (a hollow tube that runs from the kidney to your bladder) after your procedure, which helps urine drain from the kidney to your bladder. Some patients experience urinary frequency, burning, or even back pain (especially with urination). These sensations will gradually get better. Increasing your fluid intake can also improve these symptoms. While the stent is in place, your urine may continue to be bloody. This stent is temporary and must be removed by your urologist as an outpatient within 3 months unless otherwise specified.    · General: It is common to have blood in the urine after your procedure. It may be pink or even red; inform your doctor if you have a significant amount of clot in the urine or if you are unable to void at all. The urine may clear and then become bloody again especially as you are more physically active.    · Bathing: You may shower or bathe.    · Diet: You may resume your regular diet and regular medication regimen.    · Pain: You may take Tylenol (acetaminophen) 650-975mg and/or Motrin (ibuprofen) 400-600mg, available over the counter, for pain every 6 hours as needed. Do not exceed 4000 milligrams of Tylenol (acetaminophen) daily. You may alternate these medications such that you take either one every 3 hours.    o You have a stent, the following medications were sent to your pharmacy for stent related discomfort:    § Flomax (tamsulosin) 0.4mg at bedtime until stent removed    § Pyridium (phenazopyridine) 100mg every 8 hours, as needed, for kidney/bladder discomfort (max 3 days, this medication will make your urine orange)    · Antibiotics: You may be given a prescription for an antibiotic, please take this medication as instructed and be sure to complete entire course.    · Stool softeners: Do not allow yourself to become constipated as this may increase your bother from the stent and/or straining may cause bleeding. Take stool softeners (ex. Colace) or a laxative (ex. Senekot, ExLax), available over the counter, if needed.    · Activity: No heavy lifting or strenuous exercise until you are evaluated at your post-operative appointment. Otherwise, you may return to your usual level of activity.    · Anticoagulation: If you are taking any blood thinning medications, please discuss with your urologist prior to restarting these medications unless otherwise specified.    · Follow-up: If you did not already schedule your post-operative appointment, please call your urologist to schedule a follow-up appointment. This should be in approximately 1.5-2 weeks for cystoscopy and stent removal in the office.

## 2021-11-21 PROCEDURE — 81025 URINE PREGNANCY TEST: CPT

## 2021-11-21 PROCEDURE — 82365 CALCULUS SPECTROSCOPY: CPT

## 2021-11-21 PROCEDURE — 88300 SURGICAL PATH GROSS: CPT

## 2021-11-21 PROCEDURE — 52356 CYSTO/URETERO W/LITHOTRIPSY: CPT | Mod: LT

## 2021-11-21 PROCEDURE — C2625: CPT

## 2021-11-21 PROCEDURE — 76000 FLUOROSCOPY <1 HR PHYS/QHP: CPT

## 2021-11-21 PROCEDURE — C1889: CPT

## 2021-11-21 PROCEDURE — C1769: CPT

## 2021-11-21 PROCEDURE — C1758: CPT

## 2021-11-24 LAB — NIDUS STONE QN: SIGNIFICANT CHANGE UP

## 2021-11-30 ENCOUNTER — NON-APPOINTMENT (OUTPATIENT)
Age: 31
End: 2021-11-30

## 2021-12-02 ENCOUNTER — APPOINTMENT (OUTPATIENT)
Dept: UROLOGY | Facility: CLINIC | Age: 31
End: 2021-12-02
Payer: MEDICAID

## 2021-12-02 ENCOUNTER — OUTPATIENT (OUTPATIENT)
Dept: OUTPATIENT SERVICES | Facility: HOSPITAL | Age: 31
LOS: 1 days | End: 2021-12-02
Payer: MEDICAID

## 2021-12-02 VITALS
DIASTOLIC BLOOD PRESSURE: 85 MMHG | OXYGEN SATURATION: 99 % | RESPIRATION RATE: 18 BRPM | HEIGHT: 62 IN | HEART RATE: 120 BPM | BODY MASS INDEX: 31.47 KG/M2 | WEIGHT: 171 LBS | SYSTOLIC BLOOD PRESSURE: 153 MMHG | TEMPERATURE: 97 F

## 2021-12-02 DIAGNOSIS — R35.0 FREQUENCY OF MICTURITION: ICD-10-CM

## 2021-12-02 DIAGNOSIS — N20.1 CALCULUS OF URETER: ICD-10-CM

## 2021-12-02 DIAGNOSIS — Z98.890 OTHER SPECIFIED POSTPROCEDURAL STATES: Chronic | ICD-10-CM

## 2021-12-02 PROCEDURE — 52310 CYSTOSCOPY AND TREATMENT: CPT

## 2021-12-07 LAB — SURGICAL PATHOLOGY STUDY: SIGNIFICANT CHANGE UP

## 2021-12-21 DIAGNOSIS — M54.9 DORSALGIA, UNSPECIFIED: ICD-10-CM

## 2021-12-27 ENCOUNTER — APPOINTMENT (OUTPATIENT)
Dept: ORTHOPEDIC SURGERY | Facility: CLINIC | Age: 31
End: 2021-12-27

## 2022-02-01 ENCOUNTER — APPOINTMENT (OUTPATIENT)
Dept: UROLOGY | Facility: CLINIC | Age: 32
End: 2022-02-01

## 2023-05-13 ENCOUNTER — INPATIENT (INPATIENT)
Facility: HOSPITAL | Age: 33
LOS: 3 days | Discharge: HOME | End: 2023-05-17
Attending: INTERNAL MEDICINE | Admitting: INTERNAL MEDICINE
Payer: MEDICAID

## 2023-05-13 VITALS
HEART RATE: 97 BPM | SYSTOLIC BLOOD PRESSURE: 167 MMHG | TEMPERATURE: 98 F | RESPIRATION RATE: 16 BRPM | OXYGEN SATURATION: 99 % | DIASTOLIC BLOOD PRESSURE: 90 MMHG

## 2023-05-13 DIAGNOSIS — Z98.890 OTHER SPECIFIED POSTPROCEDURAL STATES: Chronic | ICD-10-CM

## 2023-05-13 PROCEDURE — 99285 EMERGENCY DEPT VISIT HI MDM: CPT

## 2023-05-13 RX ORDER — IBUPROFEN 200 MG
600 TABLET ORAL ONCE
Refills: 0 | Status: COMPLETED | OUTPATIENT
Start: 2023-05-13 | End: 2023-05-13

## 2023-05-13 RX ORDER — ACETAMINOPHEN 500 MG
975 TABLET ORAL ONCE
Refills: 0 | Status: COMPLETED | OUTPATIENT
Start: 2023-05-13 | End: 2023-05-13

## 2023-05-13 RX ORDER — LIDOCAINE 4 G/100G
1 CREAM TOPICAL ONCE
Refills: 0 | Status: COMPLETED | OUTPATIENT
Start: 2023-05-13 | End: 2023-05-13

## 2023-05-13 RX ORDER — DIAZEPAM 5 MG
2 TABLET ORAL ONCE
Refills: 0 | Status: DISCONTINUED | OUTPATIENT
Start: 2023-05-13 | End: 2023-05-13

## 2023-05-13 NOTE — ED ADULT TRIAGE NOTE - CHIEF COMPLAINT QUOTE
alert oriented c/o low back pain radiating to both legs PMHx sciatica herniated discs   pain started 10 days ago  has been using OTC meds for pain with little effect

## 2023-05-13 NOTE — ED ADULT TRIAGE NOTE - HEART RATE (BEATS/MIN)
Subjective:      Patient ID: Jayme Bahena is a 50 y.o. male. 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 Also uses Lorazepam in the morning to be less anxious and less in people's faces    Medication Refill    Numbness-stable    Chest Pain-on Toprol-XL up; not aware of palpitations or chest pain    Pain-uses gabapentin     muscle;back and neck;relaxes his muscle; Chief complaint present illness: 42-year-old white male presents unaccompanied for routine follow-up. Please see above    Review of Systems   Respiratory: Negative. Cardiovascular: Negative. Psychiatric/Behavioral:        Really feels like he is doing quite well     background/entire past medical,social and family history obtained and reviewed/updated today   Objective:   Physical Exam   Constitutional: He appears well-developed and well-nourished. Cardiovascular: Normal rate, regular rhythm and normal heart sounds. Pulmonary/Chest: Effort normal and breath sounds normal.   Neurological: He is alert. Skin: Skin is warm. No pallor. Nursing note and vitals reviewed. /84   Pulse 82   Temp 97.4 °F (36.3 °C) (Oral)   Wt 153 lb (69.4 kg)   SpO2 98%   BMI 23.96 kg/m²     Assessment:      Joe Vidal was seen today for depression, medication refill, numbness, chest pain and pain. Diagnoses and all orders for this visit:    Chronic anxiety  -     LORazepam (ATIVAN) 1 MG tablet; Take 1 tablet by mouth every morning (before breakfast) for 180 days. Need for influenza vaccination  -     INFLUENZA, QUADV, 3 YRS AND OLDER, IM, MDV, 0.5ML (AFLURIA QUADV)    Moderate single current episode of major depressive disorder ([facility] [address]  -     PARoxetine (PAXIL) 40 MG tablet; Take 1 tablet by mouth nightly    Pre-syncope  -     metoprolol succinate (TOPROL XL) 25 MG extended release tablet;  Take 1 tablet by mouth daily    Rapid palpitations  -     metoprolol succinate (TOPROL
Vaccine Information Sheet, \"Influenza - Inactivated\"  given to Nayely Reed, or parent/legal guardian of  Nayely Reed and verbalized understanding. Patient responses:    Have you ever had a reaction to a flu vaccine? No  Are you able to eat eggs without adverse effects? Yes  Do you have any current illness? No  Have you ever had Guillian Collins Syndrome? No    Flu vaccine given per order. Please see immunization tab.
97

## 2023-05-14 DIAGNOSIS — N20.0 CALCULUS OF KIDNEY: ICD-10-CM

## 2023-05-14 DIAGNOSIS — M54.9 DORSALGIA, UNSPECIFIED: ICD-10-CM

## 2023-05-14 DIAGNOSIS — M79.2 NEURALGIA AND NEURITIS, UNSPECIFIED: ICD-10-CM

## 2023-05-14 DIAGNOSIS — Z29.9 ENCOUNTER FOR PROPHYLACTIC MEASURES, UNSPECIFIED: ICD-10-CM

## 2023-05-14 DIAGNOSIS — R73.03 PREDIABETES: ICD-10-CM

## 2023-05-14 DIAGNOSIS — L30.9 DERMATITIS, UNSPECIFIED: ICD-10-CM

## 2023-05-14 LAB
ALBUMIN SERPL ELPH-MCNC: 4.3 G/DL — SIGNIFICANT CHANGE UP (ref 3.3–5)
ALP SERPL-CCNC: 60 U/L — SIGNIFICANT CHANGE UP (ref 40–120)
ALT FLD-CCNC: 15 U/L — SIGNIFICANT CHANGE UP (ref 4–33)
ANION GAP SERPL CALC-SCNC: 14 MMOL/L — SIGNIFICANT CHANGE UP (ref 7–14)
APPEARANCE UR: ABNORMAL
AST SERPL-CCNC: 16 U/L — SIGNIFICANT CHANGE UP (ref 4–32)
BACTERIA # UR AUTO: ABNORMAL
BASOPHILS # BLD AUTO: 0 K/UL — SIGNIFICANT CHANGE UP (ref 0–0.2)
BASOPHILS NFR BLD AUTO: 0 % — SIGNIFICANT CHANGE UP (ref 0–2)
BILIRUB SERPL-MCNC: 0.8 MG/DL — SIGNIFICANT CHANGE UP (ref 0.2–1.2)
BILIRUB UR-MCNC: NEGATIVE — SIGNIFICANT CHANGE UP
BUN SERPL-MCNC: 17 MG/DL — SIGNIFICANT CHANGE UP (ref 7–23)
CALCIUM SERPL-MCNC: 9.4 MG/DL — SIGNIFICANT CHANGE UP (ref 8.4–10.5)
CHLORIDE SERPL-SCNC: 103 MMOL/L — SIGNIFICANT CHANGE UP (ref 98–107)
CO2 SERPL-SCNC: 23 MMOL/L — SIGNIFICANT CHANGE UP (ref 22–31)
COLOR SPEC: SIGNIFICANT CHANGE UP
COMMENT - URINE: SIGNIFICANT CHANGE UP
CREAT SERPL-MCNC: 0.69 MG/DL — SIGNIFICANT CHANGE UP (ref 0.5–1.3)
DIFF PNL FLD: NEGATIVE — SIGNIFICANT CHANGE UP
EGFR: 116 ML/MIN/1.73M2 — SIGNIFICANT CHANGE UP
EOSINOPHIL # BLD AUTO: 0.85 K/UL — HIGH (ref 0–0.5)
EOSINOPHIL NFR BLD AUTO: 7.1 % — HIGH (ref 0–6)
EPI CELLS # UR: 3 /HPF — SIGNIFICANT CHANGE UP (ref 0–5)
GLUCOSE SERPL-MCNC: 104 MG/DL — HIGH (ref 70–99)
GLUCOSE UR QL: NEGATIVE — SIGNIFICANT CHANGE UP
HCT VFR BLD CALC: 41.6 % — SIGNIFICANT CHANGE UP (ref 34.5–45)
HGB BLD-MCNC: 13.6 G/DL — SIGNIFICANT CHANGE UP (ref 11.5–15.5)
HYALINE CASTS # UR AUTO: 0 /LPF — SIGNIFICANT CHANGE UP (ref 0–7)
IANC: 5.52 K/UL — SIGNIFICANT CHANGE UP (ref 1.8–7.4)
KETONES UR-MCNC: NEGATIVE — SIGNIFICANT CHANGE UP
LEUKOCYTE ESTERASE UR-ACNC: NEGATIVE — SIGNIFICANT CHANGE UP
LYMPHOCYTES # BLD AUTO: 4.86 K/UL — HIGH (ref 1–3.3)
LYMPHOCYTES # BLD AUTO: 40.7 % — SIGNIFICANT CHANGE UP (ref 13–44)
MCHC RBC-ENTMCNC: 26.8 PG — LOW (ref 27–34)
MCHC RBC-ENTMCNC: 32.7 GM/DL — SIGNIFICANT CHANGE UP (ref 32–36)
MCV RBC AUTO: 82.1 FL — SIGNIFICANT CHANGE UP (ref 80–100)
MONOCYTES # BLD AUTO: 0.85 K/UL — SIGNIFICANT CHANGE UP (ref 0–0.9)
MONOCYTES NFR BLD AUTO: 7.1 % — SIGNIFICANT CHANGE UP (ref 2–14)
NEUTROPHILS # BLD AUTO: 4.64 K/UL — SIGNIFICANT CHANGE UP (ref 1.8–7.4)
NEUTROPHILS NFR BLD AUTO: 38.9 % — LOW (ref 43–77)
NITRITE UR-MCNC: NEGATIVE — SIGNIFICANT CHANGE UP
PH UR: 7.5 — SIGNIFICANT CHANGE UP (ref 5–8)
PLATELET # BLD AUTO: 266 K/UL — SIGNIFICANT CHANGE UP (ref 150–400)
POTASSIUM SERPL-MCNC: 3.9 MMOL/L — SIGNIFICANT CHANGE UP (ref 3.5–5.3)
POTASSIUM SERPL-SCNC: 3.9 MMOL/L — SIGNIFICANT CHANGE UP (ref 3.5–5.3)
PROT SERPL-MCNC: 6.9 G/DL — SIGNIFICANT CHANGE UP (ref 6–8.3)
PROT UR-MCNC: ABNORMAL
RBC # BLD: 5.07 M/UL — SIGNIFICANT CHANGE UP (ref 3.8–5.2)
RBC # FLD: 11.9 % — SIGNIFICANT CHANGE UP (ref 10.3–14.5)
RBC CASTS # UR COMP ASSIST: 4 /HPF — SIGNIFICANT CHANGE UP (ref 0–4)
SODIUM SERPL-SCNC: 140 MMOL/L — SIGNIFICANT CHANGE UP (ref 135–145)
SP GR SPEC: 1.02 — SIGNIFICANT CHANGE UP (ref 1.01–1.05)
UROBILINOGEN FLD QL: SIGNIFICANT CHANGE UP
WBC # BLD: 11.94 K/UL — HIGH (ref 3.8–10.5)
WBC # FLD AUTO: 11.94 K/UL — HIGH (ref 3.8–10.5)
WBC UR QL: 2 /HPF — SIGNIFICANT CHANGE UP (ref 0–5)

## 2023-05-14 PROCEDURE — 99223 1ST HOSP IP/OBS HIGH 75: CPT

## 2023-05-14 PROCEDURE — 74176 CT ABD & PELVIS W/O CONTRAST: CPT | Mod: 26,GC,MA

## 2023-05-14 RX ORDER — MORPHINE SULFATE 50 MG/1
2 CAPSULE, EXTENDED RELEASE ORAL EVERY 4 HOURS
Refills: 0 | Status: DISCONTINUED | OUTPATIENT
Start: 2023-05-14 | End: 2023-05-15

## 2023-05-14 RX ORDER — SODIUM CHLORIDE 9 MG/ML
1000 INJECTION INTRAMUSCULAR; INTRAVENOUS; SUBCUTANEOUS ONCE
Refills: 0 | Status: COMPLETED | OUTPATIENT
Start: 2023-05-14 | End: 2023-05-14

## 2023-05-14 RX ORDER — LANOLIN ALCOHOL/MO/W.PET/CERES
3 CREAM (GRAM) TOPICAL AT BEDTIME
Refills: 0 | Status: DISCONTINUED | OUTPATIENT
Start: 2023-05-14 | End: 2023-05-17

## 2023-05-14 RX ORDER — LIDOCAINE 4 G/100G
1 CREAM TOPICAL DAILY
Refills: 0 | Status: DISCONTINUED | OUTPATIENT
Start: 2023-05-14 | End: 2023-05-17

## 2023-05-14 RX ORDER — OXYCODONE HYDROCHLORIDE 5 MG/1
10 TABLET ORAL EVERY 6 HOURS
Refills: 0 | Status: DISCONTINUED | OUTPATIENT
Start: 2023-05-14 | End: 2023-05-15

## 2023-05-14 RX ORDER — MORPHINE SULFATE 50 MG/1
4 CAPSULE, EXTENDED RELEASE ORAL ONCE
Refills: 0 | Status: DISCONTINUED | OUTPATIENT
Start: 2023-05-14 | End: 2023-05-14

## 2023-05-14 RX ORDER — GABAPENTIN 400 MG/1
1 CAPSULE ORAL
Qty: 0 | Refills: 0 | DISCHARGE

## 2023-05-14 RX ORDER — DIAZEPAM 5 MG
2 TABLET ORAL ONCE
Refills: 0 | Status: DISCONTINUED | OUTPATIENT
Start: 2023-05-14 | End: 2023-05-14

## 2023-05-14 RX ORDER — METFORMIN HYDROCHLORIDE 850 MG/1
1 TABLET ORAL
Qty: 0 | Refills: 0 | DISCHARGE

## 2023-05-14 RX ORDER — ACETAMINOPHEN 500 MG
650 TABLET ORAL EVERY 6 HOURS
Refills: 0 | Status: DISCONTINUED | OUTPATIENT
Start: 2023-05-14 | End: 2023-05-15

## 2023-05-14 RX ORDER — KETOROLAC TROMETHAMINE 30 MG/ML
30 SYRINGE (ML) INJECTION EVERY 8 HOURS
Refills: 0 | Status: DISCONTINUED | OUTPATIENT
Start: 2023-05-14 | End: 2023-05-16

## 2023-05-14 RX ORDER — METHOCARBAMOL 500 MG/1
750 TABLET, FILM COATED ORAL EVERY 8 HOURS
Refills: 0 | Status: COMPLETED | OUTPATIENT
Start: 2023-05-14 | End: 2023-05-16

## 2023-05-14 RX ORDER — HYDROCORTISONE 1 %
1 OINTMENT (GRAM) TOPICAL
Refills: 0 | Status: DISCONTINUED | OUTPATIENT
Start: 2023-05-14 | End: 2023-05-17

## 2023-05-14 RX ADMIN — Medication 30 MILLIGRAM(S): at 14:24

## 2023-05-14 RX ADMIN — MORPHINE SULFATE 4 MILLIGRAM(S): 50 CAPSULE, EXTENDED RELEASE ORAL at 02:56

## 2023-05-14 RX ADMIN — MORPHINE SULFATE 2 MILLIGRAM(S): 50 CAPSULE, EXTENDED RELEASE ORAL at 22:01

## 2023-05-14 RX ADMIN — Medication 30 MILLIGRAM(S): at 13:37

## 2023-05-14 RX ADMIN — MORPHINE SULFATE 2 MILLIGRAM(S): 50 CAPSULE, EXTENDED RELEASE ORAL at 21:31

## 2023-05-14 RX ADMIN — Medication 30 MILLIGRAM(S): at 21:31

## 2023-05-14 RX ADMIN — Medication 975 MILLIGRAM(S): at 02:30

## 2023-05-14 RX ADMIN — Medication 975 MILLIGRAM(S): at 00:08

## 2023-05-14 RX ADMIN — OXYCODONE HYDROCHLORIDE 10 MILLIGRAM(S): 5 TABLET ORAL at 17:46

## 2023-05-14 RX ADMIN — OXYCODONE HYDROCHLORIDE 10 MILLIGRAM(S): 5 TABLET ORAL at 11:34

## 2023-05-14 RX ADMIN — Medication 2 MILLIGRAM(S): at 00:08

## 2023-05-14 RX ADMIN — LIDOCAINE 1 PATCH: 4 CREAM TOPICAL at 19:53

## 2023-05-14 RX ADMIN — METHOCARBAMOL 750 MILLIGRAM(S): 500 TABLET, FILM COATED ORAL at 23:05

## 2023-05-14 RX ADMIN — MORPHINE SULFATE 4 MILLIGRAM(S): 50 CAPSULE, EXTENDED RELEASE ORAL at 06:03

## 2023-05-14 RX ADMIN — Medication 600 MILLIGRAM(S): at 00:08

## 2023-05-14 RX ADMIN — METHOCARBAMOL 750 MILLIGRAM(S): 500 TABLET, FILM COATED ORAL at 14:15

## 2023-05-14 RX ADMIN — OXYCODONE HYDROCHLORIDE 10 MILLIGRAM(S): 5 TABLET ORAL at 18:46

## 2023-05-14 RX ADMIN — LIDOCAINE 1 PATCH: 4 CREAM TOPICAL at 00:10

## 2023-05-14 RX ADMIN — LIDOCAINE 1 PATCH: 4 CREAM TOPICAL at 12:00

## 2023-05-14 RX ADMIN — Medication 3 MILLIGRAM(S): at 21:31

## 2023-05-14 RX ADMIN — Medication 1 APPLICATION(S): at 17:46

## 2023-05-14 RX ADMIN — LIDOCAINE 1 PATCH: 4 CREAM TOPICAL at 13:37

## 2023-05-14 RX ADMIN — OXYCODONE HYDROCHLORIDE 10 MILLIGRAM(S): 5 TABLET ORAL at 12:34

## 2023-05-14 RX ADMIN — Medication 30 MILLIGRAM(S): at 22:01

## 2023-05-14 RX ADMIN — SODIUM CHLORIDE 1000 MILLILITER(S): 9 INJECTION INTRAMUSCULAR; INTRAVENOUS; SUBCUTANEOUS at 02:56

## 2023-05-14 RX ADMIN — Medication 600 MILLIGRAM(S): at 02:30

## 2023-05-14 NOTE — H&P ADULT - PROBLEM SELECTOR PLAN 3
Patient noted with small scaly plague over 2nd digit MCP on left hand  - noted to be pruritic  - most likely eczema/atopic dermatitis  - patient reports no improvement with eucerin lotion  - will trial hydrocortisone ointment

## 2023-05-14 NOTE — ED PROVIDER NOTE - PROGRESS NOTE DETAILS
María PGY1: Admitted patient to hospitalist due to intractable back pain. Will likely require pain control, MRI, and PT/OT inpatient. Attempted to contact spine surgery for new onset hand numbness, but received no call-back. Will sign out to day team.

## 2023-05-14 NOTE — ED PROVIDER NOTE - OBJECTIVE STATEMENT
34 yo F w/ PCOS, pre-DM, herniated disc on MRI (1 yr prior), and nephrolithiasis s/p lithotripsy presents for 2 weeks of worsening lower back and b/l LE pain w/ no onset event. She describes the pain as shooting/shock-like pain down her legs worse with sitting or moving side-to-side. She denies any trauma to the back or any bowel/bladder incontinence or any numbness to the groin. 34 yo F w/ PCOS, pre-DM, herniated disc on MRI (1 yr prior), and nephrolithiasis s/p lithotripsy presents for 2 weeks of worsening lower back and b/l LE pain w/ no onset event. She describes the pain as shooting/shock-like pain down her legs worse with sitting or moving side-to-side. She denies any trauma to the back or any bowel/bladder incontinence or any numbness to the groin. She had similar pain 1 year ago where an MRI was performed and she was found to have disc herniation. However, she was also found to have a 15 mm kidney stone which was later removed. She was supposed to follow-up with a neurosurgeon, but did not follow-up. He back pain only returned 2 weeks ago and as of 5/13 afternoon she is unable to walk. She has been taking Magnilife (capsicum), ibuprofen, and biofreezefor her symptoms with no improvement in symptoms. She denies CP, abdominal pain, /GI symptoms including bladder/bowel incontinence, or lightheadedness/dizziness.

## 2023-05-14 NOTE — H&P ADULT - HISTORY OF PRESENT ILLNESS
Patient is a 34 y/o F with PCOS, herniated disc (noted on MRI 1 year prior per patient) and nephrolithiasis s/p lithotripsy, now presenting with 2 weeks of worsening lower back pain. Patient reports that she has had "flare-ups" of lower back pain in the past however the episodes of pain tend to self resolve within 1-2 days. She reports that this current flare up started about 2 weeks ago and has not been improving. She reports that she experiences "spasms" in her back which also cause her to experience shock like pain that radiates down her bilateral lower extremities. During these spasms she also experiences weakness in her legs and feels unsteady. Patient also reports new b/l hand numbness which she has not experienced in the past. Reports that she has been having difficulty with ambulation due to severe pain. Pain also exacerbated by any movement. No recent trauma or heavy lifting. Denies any bowel/urinary incontinence.     ED: Course Patient given Valium, Ibuprofen, Tylenol, lidocaine patch and morphine 4mg x2.

## 2023-05-14 NOTE — H&P ADULT - ASSESSMENT
34 y/o F with PCOS, herniated disc, and nephrolithiasis s/p lithotripsy now presenting with 2 weeks of worsening lower back pain and b/l LE pain. Admitted to medicine for further workup and pain management.

## 2023-05-14 NOTE — H&P ADULT - NSHPPHYSICALEXAM_GEN_ALL_CORE
GENERAL: NAD, lying in bed comfortably  HEENT: NC/AT, EOMI, PERRL  NECK: Supple, No JVD  CHEST/LUNG: CTAB, no increased WOB  HEART: RRR, no m/r/g  ABDOMEN: soft, NT, ND, BS+  EXTREMITIES:  2+ peripheral pulses, no clubbing, no edema  NERVOUS SYSTEM: A&Ox3, sensation grossly intact throughout  MSK: FROM all 4 extremities, b/l feet with diminished strength, likely 2/2 pain  SKIN: left hand second MCP joint with scaly plaque

## 2023-05-14 NOTE — H&P ADULT - PROBLEM/PLAN-3
DISPLAY PLAN FREE TEXT -abdomen distended, AXR with air-fluid bowel loops  -recommend bowel rest to patient, but she is adamant on needing nutriton, will give liquid diet and monitor  -patient reports she is currently passing flatus  -encourage ambulation  -opioids discontinued, hold iron for now  -if develops nausea/vomiting would keep strictly NPO and may need NGT -abdomen distended, AXR with air-fluid bowel loops  -recommend bowel rest to patient, but she is adamant on needing nutrition, will give liquid diet and monitor  -patient reports she is currently passing flatus  -encourage ambulation  -opioids discontinued, hold iron for now  -hold oxybutynin as may decrease colonic transit  -if develops nausea/vomiting would keep strictly NPO and may need NGT  -would ensure abdominal distension and having BM before discharge

## 2023-05-14 NOTE — ED ADULT NURSE REASSESSMENT NOTE - STATUS
awaits dispo Pt with c/o back pain unable to walk pain travels down both legs. Pt received meds with no improvement.

## 2023-05-14 NOTE — H&P ADULT - PROBLEM SELECTOR PLAN 1
Patient presenting with acute on chronic onset of lower back pain with radiation to LE's  - also with intermittent new b/l hand numbness  - patient with symptoms appearing to be associated with movement and periodic muscle spasms  - no saddle anesthesia, no urinary/bowel incontinence, low concern for acute cord compression  - plan to begin pain control regimen with toradol, baclofen, lidocaine patch, oxycodone PRN and morphine PRN  - PT eval  - would consider NSG input regarding further imaging vs utility for steroids Patient presenting with acute on chronic onset of lower back pain with radiation to LE's  - also with intermittent new b/l hand numbness  - patient with symptoms appearing to be associated with movement and periodic muscle spasms  - no saddle anesthesia, no urinary/bowel incontinence, low concern for acute cord compression  - will obtain MR imaging of C/T/L spine to further investigate (ordered 5/14/23), patient may require anxiolytic medication prior to MRI as she stated she had some anxiety with prior studies  - plan to begin pain control regimen with toradol, baclofen, lidocaine patch, oxycodone PRN and morphine PRN  - PT eval  - would consider NSG input regarding utility for steroids once imaging obtained

## 2023-05-14 NOTE — PATIENT PROFILE ADULT - FALL HARM RISK - HARM RISK INTERVENTIONS

## 2023-05-14 NOTE — ED ADULT NURSE REASSESSMENT NOTE - NS ED NURSE REASSESS COMMENT FT1
Patient in 23a. A&O4. Respirations even and unlabored. Patient complaining of back pain. No signs of acute distrss noted. Comfort and safety maintained.
Pt crying following intial pain meds pt reports no relief. MD Castanon at bedside. to discuss further treatment plan to include  IV access  labs sent, fluids and IV Morphin . Plan for CT stone hunt... pt with hx of renal stones

## 2023-05-14 NOTE — H&P ADULT - NSHPLABSRESULTS_GEN_ALL_CORE
LABS:                         13.6   11.94 )-----------( 266      ( 14 May 2023 03:15 )             41.6     -    140  |  103  |  17  ----------------------------<  104<H>  3.9   |  23  |  0.69    Ca    9.4      14 May 2023 03:15    TPro  6.9  /  Alb  4.3  /  TBili  0.8  /  DBili  x   /  AST  16  /  ALT  15  /  AlkPhos  60  -    Urinalysis Basic - ( 14 May 2023 00:46 )    Color: Light Yellow / Appearance: Slightly Turbid / S.024 / pH: x  Gluc: x / Ketone: Negative  / Bili: Negative / Urobili: <2 mg/dL   Blood: x / Protein: Trace / Nitrite: Negative   Leuk Esterase: Negative / RBC: 4 /HPF / WBC 2 /HPF   Sq Epi: x / Non Sq Epi: x / Bacteria: Occasional    RADIOLOGY, EKG & ADDITIONAL TESTS: Reviewed.    < from: CT Renal Stone Hunt (23 @ 04:57) >    FINDINGS:  Evaluation of solid organs and vascular structures is limited without   intravenous contrast.    LOWER CHEST: Stable 3 mm right lower lobe nodule (301, 13). 3 mm right   middle lobe nodule (301, 12).    LIVER: Within normal limits.  BILE DUCTS: Normal caliber.  GALLBLADDER: Within normal limits.  SPLEEN: Within normal limits.  PANCREAS: Within normal limits.  ADRENALS: Within normal limits.  KIDNEYS/URETERS: No hydronephrosis or obstructing stone. Bilateral   fat-containing nonobstructing renal calculi.    BLADDER: Within normal limits.  REPRODUCTIVE ORGANS: Uterus and adnexa within normal limits.    BOWEL: No bowel obstruction. Appendix is normal.  PERITONEUM: No ascites.  VESSELS: Normal caliber aorta.  RETROPERITONEUM/LYMPH NODES: No lymphadenopathy.  ABDOMINAL WALL: Tiny fat-containing umbilical hernia.  BONES: Degenerative changes.    IMPRESSION:  No hydronephrosis or obstructing stone. Punctate bilateral nonobstructing   renal calculi.

## 2023-05-14 NOTE — H&P ADULT - PROBLEM SELECTOR PLAN 4
Patient with reported history of prediabetes, previously on metformin but self-d/c'd  - BG currently appearing WNL  - check A1C in AM
73 yo male pmh ex smoker, cad, s/p cabg comes to ed with cough x 2 days with intermittent chest  pain; pt in ed , with heart rate 45

## 2023-05-14 NOTE — ED PROVIDER NOTE - ATTENDING CONTRIBUTION TO CARE
I performed a face-to-face evaluation of the patient and performed a history and physical examination along with the resident or ACP, and/or medical student above.  I agree with the history and physical examination as documented by the resident or ACP, and/or medical student above.  [place]:  GENERAL: no acute distress, endomorphic body habitus  HEENT: atraumatic, normocephalic, vision grossly intact, EOMI, no conjunctivitis or discharge, hearing grossly intact, no nasal discharge or epistaxis, clear pharynx  CV: regular rate, normal rhythm, normal S1/S2, no murmurs/rubs, no cyanosis  PULM: normal work of breathing, clear breath sounds in b/l upper/lower lung fields, no crackles/rales/rhonchi/wheezing  GI: soft/non-tender/nondistended abdomen, no guarding or rebound tenderness, no palpable masses  : no CVA tenderness  NEURO: +SLR on b/l LE, decreased sensation in b/l feet, 5/5 upper/lower extremity strength, A&Ox4, follows commands, normal speech, no focal motor or sensory deficits  MSK: no joint tenderness/swelling/erythema, no spine TTP, ranging all extremities with no appreciable loss of ROM  EXT: no peripheral edema, no calf tenderness, no redness or swelling  SKIN: warm, dry, and intact, no rashes  PSYCH: appropriate mood and affect

## 2023-05-14 NOTE — ED PROVIDER NOTE - CLINICAL SUMMARY MEDICAL DECISION MAKING FREE TEXT BOX
34 yo F w/ PCOS, pre-DM, herniated disc on MRI (1 yr prior), and nephrolithiasis s/p lithotripsy presents for 2 weeks of worsening lower back and b/l LE pain w/ no onset event. Physical exams remarkable for +SLR on b/l LE, decreased sensation in b/l feet, 5/5 upper/lower extremity strength. Concern for sciatic pain w/ possible interval worsening of disc herniation. Low concern for cauda equina syndrome. Plan for pain control, urine studies, and labs. Dispo pending pain control effectiveness.

## 2023-05-14 NOTE — H&P ADULT - PROBLEM SELECTOR PLAN 2
Patient with recent episode of nephrolithiasis s/p lithotripsy  - recent CT stone hunt performed showing only punctate non-obstructing stones bilaterally Patient with recent episode of nephrolithiasis s/p lithotripsy  - recent CT stone hunt performed showing only punctate non-obstructing stones bilaterally  - patient urinating without issue currently  - will encourage PO hydration

## 2023-05-14 NOTE — H&P ADULT - NSHPREVIEWOFSYSTEMS_GEN_ALL_CORE
CONSTITUTIONAL: No weakness, fevers or chills  EYES/ENT: No visual changes;  No vertigo or throat pain   NECK: No pain or stiffness  RESPIRATORY: No cough, wheezing, hemoptysis; No shortness of breath  CARDIOVASCULAR: No chest pain or palpitations  GASTROINTESTINAL: No abdominal or epigastric pain. No nausea, vomiting, or hematemesis; No diarrhea or constipation. No melena or hematochezia.  GENITOURINARY: No dysuria, frequency or hematuria  NEUROLOGICAL: No numbness or weakness  SKIN: No itching, rashes  PSYCHOLOGICAL: appropriate mood and affect

## 2023-05-14 NOTE — ED PROVIDER NOTE - PHYSICAL EXAMINATION
GENERAL: no acute distress, endomorphic body habitus  HEENT: atraumatic, normocephalic, vision grossly intact, EOMI, no conjunctivitis or discharge, hearing grossly intact, no nasal discharge or epistaxis, clear pharynx  CV: regular rate, normal rhythm, normal S1/S2, no murmurs/rubs, no cyanosis  PULM: normal work of breathing, clear breath sounds in b/l upper/lower lung fields, no crackles/rales/rhonchi/wheezing  GI: soft/non-tender/nondistended abdomen, no guarding or rebound tenderness, no palpable masses  : no CVA tenderness  NEURO: +SLR on b/l LE, decreased sensation in b/l feet, 5/5 upper/lower extremity strength, A&Ox4, follows commands, normal speech, no focal motor or sensory deficits  MSK: no joint tenderness/swelling/erythema, no spine TTP, ranging all extremities with no appreciable loss of ROM  EXT: no peripheral edema, no calf tenderness, no redness or swelling  SKIN: warm, dry, and intact, no rashes  PSYCH: appropriate mood and affect

## 2023-05-14 NOTE — ED ADULT NURSE NOTE - OBJECTIVE STATEMENT
Patient received in 23a. A&O4. Ambulatory. PMH of sciatica with herniated disc. Came into ED with complaints of lower back pain for the past 2 weeks. States pain radiates down to both legs. States pain is better when moving, worse when sitting In one position for a long time. Respirations even and unlabored. Denies SOB/Chest pain, N/V/D. No signs of acute distress noted. Comfort and safety maintained. Stretcher in lowest position. Friend at bedside. Medicated per MD orders.

## 2023-05-15 LAB
A1C WITH ESTIMATED AVERAGE GLUCOSE RESULT: 5.5 % — SIGNIFICANT CHANGE UP (ref 4–5.6)
ANION GAP SERPL CALC-SCNC: 9 MMOL/L — SIGNIFICANT CHANGE UP (ref 7–14)
BUN SERPL-MCNC: 14 MG/DL — SIGNIFICANT CHANGE UP (ref 7–23)
CALCIUM SERPL-MCNC: 9.2 MG/DL — SIGNIFICANT CHANGE UP (ref 8.4–10.5)
CHLORIDE SERPL-SCNC: 105 MMOL/L — SIGNIFICANT CHANGE UP (ref 98–107)
CO2 SERPL-SCNC: 24 MMOL/L — SIGNIFICANT CHANGE UP (ref 22–31)
CREAT SERPL-MCNC: 0.72 MG/DL — SIGNIFICANT CHANGE UP (ref 0.5–1.3)
CULTURE RESULTS: SIGNIFICANT CHANGE UP
EGFR: 112 ML/MIN/1.73M2 — SIGNIFICANT CHANGE UP
ESTIMATED AVERAGE GLUCOSE: 111 — SIGNIFICANT CHANGE UP
GLUCOSE SERPL-MCNC: 103 MG/DL — HIGH (ref 70–99)
HCT VFR BLD CALC: 45.8 % — HIGH (ref 34.5–45)
HGB BLD-MCNC: 14.6 G/DL — SIGNIFICANT CHANGE UP (ref 11.5–15.5)
MAGNESIUM SERPL-MCNC: 2 MG/DL — SIGNIFICANT CHANGE UP (ref 1.6–2.6)
MCHC RBC-ENTMCNC: 27.5 PG — SIGNIFICANT CHANGE UP (ref 27–34)
MCHC RBC-ENTMCNC: 31.9 GM/DL — LOW (ref 32–36)
MCV RBC AUTO: 86.4 FL — SIGNIFICANT CHANGE UP (ref 80–100)
NRBC # BLD: 0 /100 WBCS — SIGNIFICANT CHANGE UP (ref 0–0)
NRBC # FLD: 0 K/UL — SIGNIFICANT CHANGE UP (ref 0–0)
PHOSPHATE SERPL-MCNC: 3.1 MG/DL — SIGNIFICANT CHANGE UP (ref 2.5–4.5)
PLATELET # BLD AUTO: 252 K/UL — SIGNIFICANT CHANGE UP (ref 150–400)
POTASSIUM SERPL-MCNC: 4.4 MMOL/L — SIGNIFICANT CHANGE UP (ref 3.5–5.3)
POTASSIUM SERPL-SCNC: 4.4 MMOL/L — SIGNIFICANT CHANGE UP (ref 3.5–5.3)
RBC # BLD: 5.3 M/UL — HIGH (ref 3.8–5.2)
RBC # FLD: 11.9 % — SIGNIFICANT CHANGE UP (ref 10.3–14.5)
SODIUM SERPL-SCNC: 138 MMOL/L — SIGNIFICANT CHANGE UP (ref 135–145)
SPECIMEN SOURCE: SIGNIFICANT CHANGE UP
WBC # BLD: 9 K/UL — SIGNIFICANT CHANGE UP (ref 3.8–10.5)
WBC # FLD AUTO: 9 K/UL — SIGNIFICANT CHANGE UP (ref 3.8–10.5)

## 2023-05-15 PROCEDURE — 72146 MRI CHEST SPINE W/O DYE: CPT | Mod: 26

## 2023-05-15 PROCEDURE — 72148 MRI LUMBAR SPINE W/O DYE: CPT | Mod: 26

## 2023-05-15 PROCEDURE — 72141 MRI NECK SPINE W/O DYE: CPT | Mod: 26

## 2023-05-15 RX ORDER — ACETAMINOPHEN 500 MG
650 TABLET ORAL EVERY 6 HOURS
Refills: 0 | Status: COMPLETED | OUTPATIENT
Start: 2023-05-15 | End: 2023-05-17

## 2023-05-15 RX ORDER — OXYCODONE HYDROCHLORIDE 5 MG/1
10 TABLET ORAL EVERY 4 HOURS
Refills: 0 | Status: DISCONTINUED | OUTPATIENT
Start: 2023-05-15 | End: 2023-05-17

## 2023-05-15 RX ADMIN — OXYCODONE HYDROCHLORIDE 10 MILLIGRAM(S): 5 TABLET ORAL at 06:45

## 2023-05-15 RX ADMIN — Medication 30 MILLIGRAM(S): at 13:51

## 2023-05-15 RX ADMIN — Medication 30 MILLIGRAM(S): at 12:51

## 2023-05-15 RX ADMIN — Medication 1 APPLICATION(S): at 06:27

## 2023-05-15 RX ADMIN — Medication 0.5 MILLIGRAM(S): at 16:55

## 2023-05-15 RX ADMIN — METHOCARBAMOL 750 MILLIGRAM(S): 500 TABLET, FILM COATED ORAL at 12:50

## 2023-05-15 RX ADMIN — Medication 30 MILLIGRAM(S): at 06:45

## 2023-05-15 RX ADMIN — Medication 650 MILLIGRAM(S): at 19:44

## 2023-05-15 RX ADMIN — METHOCARBAMOL 750 MILLIGRAM(S): 500 TABLET, FILM COATED ORAL at 22:18

## 2023-05-15 RX ADMIN — METHOCARBAMOL 750 MILLIGRAM(S): 500 TABLET, FILM COATED ORAL at 06:27

## 2023-05-15 RX ADMIN — Medication 30 MILLIGRAM(S): at 22:18

## 2023-05-15 RX ADMIN — Medication 30 MILLIGRAM(S): at 22:48

## 2023-05-15 RX ADMIN — Medication 75 MILLIGRAM(S): at 19:45

## 2023-05-15 RX ADMIN — OXYCODONE HYDROCHLORIDE 10 MILLIGRAM(S): 5 TABLET ORAL at 06:15

## 2023-05-15 RX ADMIN — LIDOCAINE 1 PATCH: 4 CREAM TOPICAL at 12:42

## 2023-05-15 RX ADMIN — Medication 30 MILLIGRAM(S): at 06:15

## 2023-05-15 RX ADMIN — Medication 1 APPLICATION(S): at 16:58

## 2023-05-15 RX ADMIN — LIDOCAINE 1 PATCH: 4 CREAM TOPICAL at 19:19

## 2023-05-15 NOTE — PHYSICAL THERAPY INITIAL EVALUATION ADULT - NSPTDISCHREC_GEN_A_CORE
Anticipate home with no skilled PT needs. Functional mobility limited at this time secondary to pain.

## 2023-05-15 NOTE — PHYSICAL THERAPY INITIAL EVALUATION ADULT - ADDITIONAL COMMENTS
Pt. was left seated at edge of bed post PT Evaluation, no apparent distress, call fleming within reach. Cristin HERNANDEZ made aware of pt. status and participation in PT.

## 2023-05-15 NOTE — PHYSICAL THERAPY INITIAL EVALUATION ADULT - GAIT DEVIATIONS NOTED, PT EVAL
left lateral shift/decreased melany/increased time in double stance/decreased step length/decreased stride length/decreased weight-shifting ability

## 2023-05-15 NOTE — CONSULT NOTE ADULT - SUBJECTIVE AND OBJECTIVE BOX
Chief Complaint: Low back pain    HPI:  Patient is a 34 y/o F with PCOS, herniated disc (noted on MRI 1 year prior per patient) and nephrolithiasis s/p lithotripsy, now presenting with 2 weeks of worsening lower back pain. Patient reports that she has had "flare-ups" of lower back pain in the past however the episodes of pain tend to self resolve within 1-2 days. She reports that this current flare up started about 2 weeks ago and has not been improving. She reports that she experiences "spasms" in her back which also cause her to experience shock like pain that radiates down her bilateral lower extremities. During these spasms she also experiences weakness in her legs and feels unsteady. Patient also reports new b/l hand numbness which she has not experienced in the past. Reports that she has been having difficulty with ambulation due to severe pain. Pain also exacerbated by any movement. No recent trauma or heavy lifting. Denies any bowel/urinary incontinence.     ED: Course Patient given Valium, Ibuprofen, Tylenol, lidocaine patch and morphine 4mg x2. (14 May 2023 10:25)      PAST MEDICAL & SURGICAL HISTORY:  PCOS (polycystic ovarian syndrome)      Pre-diabetes      DDD (degenerative disc disease), lumbar      Renal calculi  left- non obstructing      Obesity      S/P dilation and curettage  missed            FAMILY HISTORY:      SOCIAL HISTORY:  [ ] Denies Smoking, Alcohol, or Drug Use    Allergies    cephalexin (Hives)  oral contraceptives (Anaphylaxis)    Intolerances        PAIN MEDICATIONS:  acetaminophen     Tablet .. 650 milliGRAM(s) Oral every 6 hours PRN  ketorolac   Injectable 30 milliGRAM(s) IV Push every 8 hours  LORazepam     Tablet 0.5 milliGRAM(s) Oral once PRN  melatonin 3 milliGRAM(s) Oral at bedtime PRN  methocarbamol 750 milliGRAM(s) Oral every 8 hours  morphine  - Injectable 2 milliGRAM(s) IV Push every 4 hours PRN  oxyCODONE    IR 10 milliGRAM(s) Oral every 6 hours PRN      Heme:    Antibiotics:    Cardiovascular:    GI:  aluminum hydroxide/magnesium hydroxide/simethicone Suspension 30 milliLiter(s) Oral every 4 hours PRN    Endocrine:    All Other Medications:  hydrocortisone 2.5% Ointment 1 Application(s) Topical two times a day  lidocaine   4% Patch 1 Patch Transdermal daily          Vital Signs Last 24 Hrs  T(C): 36.7 (15 May 2023 10:48), Max: 37.1 (14 May 2023 18:40)  T(F): 98 (15 May 2023 10:48), Max: 98.7 (14 May 2023 18:40)  HR: 92 (15 May 2023 10:48) (72 - 92)  BP: 144/95 (15 May 2023 10:48) (124/90 - 144/95)  BP(mean): --  RR: 20 (15 May 2023 10:48) (17 - 20)  SpO2: 92% (15 May 2023 10:48) (92% - 98%)    Parameters below as of 15 May 2023 10:48  Patient On (Oxygen Delivery Method): room air        PAIN SCORE:   4/10      SCALE USED: (1-10 VNRS)             PHYSICAL EXAM:    GENERAL: NAD, well-groomed, well-developed        LABS:                          14.6   9.00  )-----------( 252      ( 15 May 2023 06:11 )             45.8     05-15    138  |  105  |  14  ----------------------------<  103<H>  4.4   |  24  |  0.72    Ca    9.2      15 May 2023 06:11  Phos  3.1     05-15  Mg     2.00     05-15    TPro  6.9  /  Alb  4.3  /  TBili  0.8  /  DBili  x   /  AST  16  /  ALT  15  /  AlkPhos  60  05-14      Urinalysis Basic - ( 14 May 2023 00:46 )    Color: Light Yellow / Appearance: Slightly Turbid / S.024 / pH: x  Gluc: x / Ketone: Negative  / Bili: Negative / Urobili: <2 mg/dL   Blood: x / Protein: Trace / Nitrite: Negative   Leuk Esterase: Negative / RBC: 4 /HPF / WBC 2 /HPF   Sq Epi: x / Non Sq Epi: x / Bacteria: Occasional    SUMMARY:  Patient seen at bedside, complaining of lower back pain radiating to bilateral lower extremities. Patient had chronic pain since she had her son in . Patient states she had “flare ups” since then but the pain usually resolves in 2 days. Patient states 2 weeks ago she started this severe pain and was experiencing 20/10 pain when she came to the hospital. Patient rates her pain at 4/10 currently. Patient reports difficulty standing up due to pain, weakness, numbness and tingling on her lower extremities. Patient also with numbness and tingling on her upper extremities. Patient states she was seeing her PCP one year ago and was told to get a CT scan and then an MRI but did not follow up since she got busy. Patient also saw a pain management doctor through [facility] [employer] at that time received epidural injections from which she was getting severe “burning sensation”. Patient states she was also offered Medical Marijuana at the time which she did not want to take. Patient reports taking Gabapentin 300mg at bedtime for a month, one year ago which did not help her with the pain. Patient currently with no chronic pain management MD outpatient. Patient reports pain relief with the current pain regimen, but reports pain with movements. Patient denies anxiety and depression. Denies alcohol use, smoking and drug use. Patient alert and orientedx4. Answers questions appropriately. Not in any apparent distress.  RECOMMENDATIONS:  1) Recommend changing current orders for PO Acetaminophen to:  PO Acetaminophen 650mg Q6H standing x2 days, then PRN for pain. Not to be given within 6 hours of last dose of Acetaminophen.  2) Recommend continuing current orders for Toradol and Robaxin.  3) Recommend PO Lyrica 75mg BID. Hold for over sedation.  4) Recommend discontinuing current orders for IV Morphine.  5) Recommend discontinuing current orders for PO Oxycodone instead order:  PO Oxycodone 10mg Q4H PRN for moderate to severe pain. Hold for over sedation. Not to be given within one hour of any other immediate acting opioid.  6) Recommend Neurosurgery consult for back pain radiating to legs with numbness and tingling on upper and lower extremities.  7) Recommend follow up with chronic pain management MD upon discharge from the hospital. Patient may call insurance to get a list of doctors near her who accepts her insurance.  I STOP reviewed and no medications found.  Discussed patient with Dr. Garibay who agrees with the above plan. Pain service to sign off. Please call chronic pain service if further assistance needed with pain management.

## 2023-05-16 LAB
ANION GAP SERPL CALC-SCNC: 14 MMOL/L — SIGNIFICANT CHANGE UP (ref 7–14)
BUN SERPL-MCNC: 17 MG/DL — SIGNIFICANT CHANGE UP (ref 7–23)
CALCIUM SERPL-MCNC: 9.2 MG/DL — SIGNIFICANT CHANGE UP (ref 8.4–10.5)
CHLORIDE SERPL-SCNC: 105 MMOL/L — SIGNIFICANT CHANGE UP (ref 98–107)
CO2 SERPL-SCNC: 21 MMOL/L — LOW (ref 22–31)
CREAT SERPL-MCNC: 0.62 MG/DL — SIGNIFICANT CHANGE UP (ref 0.5–1.3)
EGFR: 119 ML/MIN/1.73M2 — SIGNIFICANT CHANGE UP
GLUCOSE SERPL-MCNC: 91 MG/DL — SIGNIFICANT CHANGE UP (ref 70–99)
HCT VFR BLD CALC: 42.9 % — SIGNIFICANT CHANGE UP (ref 34.5–45)
HGB BLD-MCNC: 14.1 G/DL — SIGNIFICANT CHANGE UP (ref 11.5–15.5)
MAGNESIUM SERPL-MCNC: 2 MG/DL — SIGNIFICANT CHANGE UP (ref 1.6–2.6)
MCHC RBC-ENTMCNC: 27.6 PG — SIGNIFICANT CHANGE UP (ref 27–34)
MCHC RBC-ENTMCNC: 32.9 GM/DL — SIGNIFICANT CHANGE UP (ref 32–36)
MCV RBC AUTO: 84 FL — SIGNIFICANT CHANGE UP (ref 80–100)
NRBC # BLD: 0 /100 WBCS — SIGNIFICANT CHANGE UP (ref 0–0)
NRBC # FLD: 0 K/UL — SIGNIFICANT CHANGE UP (ref 0–0)
PHOSPHATE SERPL-MCNC: 3 MG/DL — SIGNIFICANT CHANGE UP (ref 2.5–4.5)
PLATELET # BLD AUTO: 212 K/UL — SIGNIFICANT CHANGE UP (ref 150–400)
POTASSIUM SERPL-MCNC: 4.4 MMOL/L — SIGNIFICANT CHANGE UP (ref 3.5–5.3)
POTASSIUM SERPL-SCNC: 4.4 MMOL/L — SIGNIFICANT CHANGE UP (ref 3.5–5.3)
RBC # BLD: 5.11 M/UL — SIGNIFICANT CHANGE UP (ref 3.8–5.2)
RBC # FLD: 11.7 % — SIGNIFICANT CHANGE UP (ref 10.3–14.5)
SODIUM SERPL-SCNC: 140 MMOL/L — SIGNIFICANT CHANGE UP (ref 135–145)
WBC # BLD: 7.62 K/UL — SIGNIFICANT CHANGE UP (ref 3.8–10.5)
WBC # FLD AUTO: 7.62 K/UL — SIGNIFICANT CHANGE UP (ref 3.8–10.5)

## 2023-05-16 RX ORDER — METHOCARBAMOL 500 MG/1
750 TABLET, FILM COATED ORAL EVERY 8 HOURS
Refills: 0 | Status: DISCONTINUED | OUTPATIENT
Start: 2023-05-16 | End: 2023-05-17

## 2023-05-16 RX ORDER — DEXAMETHASONE 0.5 MG/5ML
4 ELIXIR ORAL EVERY 6 HOURS
Refills: 0 | Status: DISCONTINUED | OUTPATIENT
Start: 2023-05-16 | End: 2023-05-17

## 2023-05-16 RX ADMIN — Medication 30 MILLIGRAM(S): at 07:31

## 2023-05-16 RX ADMIN — OXYCODONE HYDROCHLORIDE 10 MILLIGRAM(S): 5 TABLET ORAL at 22:02

## 2023-05-16 RX ADMIN — Medication 1 APPLICATION(S): at 07:01

## 2023-05-16 RX ADMIN — Medication 75 MILLIGRAM(S): at 17:35

## 2023-05-16 RX ADMIN — Medication 1 APPLICATION(S): at 17:36

## 2023-05-16 RX ADMIN — OXYCODONE HYDROCHLORIDE 10 MILLIGRAM(S): 5 TABLET ORAL at 22:32

## 2023-05-16 RX ADMIN — Medication 75 MILLIGRAM(S): at 07:01

## 2023-05-16 RX ADMIN — Medication 650 MILLIGRAM(S): at 17:35

## 2023-05-16 RX ADMIN — METHOCARBAMOL 750 MILLIGRAM(S): 500 TABLET, FILM COATED ORAL at 07:02

## 2023-05-16 RX ADMIN — Medication 4 MILLIGRAM(S): at 11:32

## 2023-05-16 RX ADMIN — Medication 4 MILLIGRAM(S): at 17:35

## 2023-05-16 RX ADMIN — Medication 3 MILLIGRAM(S): at 22:02

## 2023-05-16 RX ADMIN — Medication 650 MILLIGRAM(S): at 07:01

## 2023-05-16 RX ADMIN — Medication 650 MILLIGRAM(S): at 07:31

## 2023-05-16 RX ADMIN — Medication 30 MILLIGRAM(S): at 07:00

## 2023-05-16 NOTE — CONSULT NOTE ADULT - SUBJECTIVE AND OBJECTIVE BOX
Patient is a 33y Female [facility] nephrolithiasis admitted to ED with 2 week history of acute on chronic back pain. Patient states she had similar back pain about 6 years ago after she had her son, however it resolved on its own. Patient reports sharp, electrical like pain radiation down Bilat LEs intermittently. So severe at times she is unable to ambulate. Any movement makes the pain worse. Denies inciting event or injury. Denies numbness/tingling/paresthesias/weakness. Denies bowel/bladder incontinence. Denies fevers/chills. No other complaints at this time.    REVIEW OF SYSTEMS:    CONSTITUTIONAL: No weakness, fevers or chills  EYES/ENT: No visual changes;  No vertigo or throat pain   NECK: No pain or stiffness  RESPIRATORY: No cough, wheezing, hemoptysis; No shortness of breath  CARDIOVASCULAR: No chest pain or palpitations  GASTROINTESTINAL: No abdominal or epigastric pain. No nausea, vomiting, or hematemesis; No diarrhea or constipation. No melena or hematochezia.  GENITOURINARY: No dysuria, frequency or hematuria  NEUROLOGICAL: No numbness or weakness  SKIN: No itching, rashes      HEALTH ISSUES - PROBLEM Dx:  Pain of back and lower extremity    Prediabetes    Nephrolithiasis    Need for prophylactic measure    Hand eczema            MEDICATIONS  (STANDING):  acetaminophen     Tablet .. 650 milliGRAM(s) Oral every 6 hours  dexAMETHasone  Injectable 4 milliGRAM(s) IV Push every 6 hours  hydrocortisone 2.5% Ointment 1 Application(s) Topical two times a day  lidocaine   4% Patch 1 Patch Transdermal daily  pregabalin 75 milliGRAM(s) Oral two times a day      Allergies    cephalexin (Hives)  oral contraceptives (Anaphylaxis)    Intolerances        PAST MEDICAL & SURGICAL HISTORY:  Diabetes    PCOS (polycystic ovarian syndrome)    Pre-diabetes     (normal spontaneous vaginal delivery)    DDD (degenerative disc disease), lumbar    Renal calculi  left- non obstructing    Obesity    No significant past surgical history    H/O induced     S/P dilation and curettage  missed                                14.1   7.62  )-----------( 212      ( 16 May 2023 07:45 )             42.9       16 May 2023 07:45    140    |  105    |  17     ----------------------------<  91     4.4     |  21     |  0.62     Ca    9.2        16 May 2023 07:45  Phos  3.0       16 May 2023 07:45  Mg     2.00      16 May 2023 07:45                Vital Signs Last 24 Hrs  T(C): 36.7 (23 @ 14:26), Max: 36.8 (23 @ 07:30)  T(F): 98 (23 @ 14:26), Max: 98.2 (23 @ 07:30)  HR: 79 (23 @ 14:26) (79 - 82)  BP: 135/89 (23 @ 14:26) (135/89 - 146/94)  BP(mean): --  RR: 18 (23 @ 14:26) (18 - 18)  SpO2: 100% (23 @ 14:26) (100% - 100%)    Gen: NAD    Spine PE:  Skin intact  No gross deformity  No midline TTP C/T/L/S spine  No bony step offs  No paraspinal muscle ttp/hypertonicity  No saddle anesthesia  Positive SLR left side             Deltoid        Bicep        Tricep          Wrist Ext    Wrist Flex    Finger Flex          Finger Abd  R            5/5          5/5           5/5              5/5                5/5	           5/5                         5/5  L             5/5           5/5          5/5              5/5                5/5                   5/5                        5/5                Hip Flex       Quad     Ankle DF        Ankle PF       Toe Ext        Hamstring    R            4/5              4/5          4/5                 4/5                 4/5	                4/5  L            4/5              4/5           4/5                 4/5                 4/5                4/5    Exam limited by Effort and Pain    Sensory:            C5         C6         C7      C8       T1        (0=absent, 1=impaired, 2=normal, NT=not testable)  R         2            2           2        2         2  L          2            2           2        2         2               L2          L3         L4      L5       S1         (0=absent, 1=impaired, 2=normal, NT=not testable)  R         2            2            2        2        2  L          2            2           2        2         2      Imaging: MRI C/T/L Spine  < from: MR Lumbar Spine No Cont (05.15.23 @ 19:01) >    IMPRESSION:    1. CERVICAL SPINE:   Reversal of the normal cervical lordosis suggestive   of disc pathology and / or muscle spasm. No significant disc pathology.   Cervical cord intact.    2. THORACIC SPINE:   No significant disc pathology. Thoracic cord contact   or    3. LUMBAR SPINE:   L3-L4 shallow right central annular fissure  (disc   herniation).  L4-L5 mild diffuse disc bulge with superimposed right   central disc protrusion  (disc herniation).   L5-S1 leftcentral disc   extrusion  (disc herniation)  displaces the left S1 nerve root in the   ventral lateral recess of the canal    --- End of Report ---    < end of copied text >       Ortho Spine Consult     Patient is a 33y Female [facility] nephrolithiasis admitted to ED with 2 week history of acute on chronic back pain. Patient states she had similar back pain about 6 years ago after she had her son, however it resolved on its own. Patient reports sharp, electrical like pain radiation down Bilat LEs intermittently. So severe at times she is unable to ambulate. Any movement makes the pain worse. Reports that pain has gotten significantly better since she was admitted to hospital with pain meds given. Denies inciting event or injury. Denies numbness/tingling/paresthesias/weakness. Denies bowel/bladder incontinence. Denies fevers/chills. No other complaints at this time.    REVIEW OF SYSTEMS:    CONSTITUTIONAL: No weakness, fevers or chills  EYES/ENT: No visual changes;  No vertigo or throat pain   NECK: No pain or stiffness  RESPIRATORY: No cough, wheezing, hemoptysis; No shortness of breath  CARDIOVASCULAR: No chest pain or palpitations  GASTROINTESTINAL: No abdominal or epigastric pain. No nausea, vomiting, or hematemesis; No diarrhea or constipation. No melena or hematochezia.  GENITOURINARY: No dysuria, frequency or hematuria  NEUROLOGICAL: No numbness or weakness  SKIN: No itching, rashes      HEALTH ISSUES - PROBLEM Dx:  Pain of back and lower extremity    Prediabetes    Nephrolithiasis    Need for prophylactic measure    Hand eczema            MEDICATIONS  (STANDING):  acetaminophen     Tablet .. 650 milliGRAM(s) Oral every 6 hours  dexAMETHasone  Injectable 4 milliGRAM(s) IV Push every 6 hours  hydrocortisone 2.5% Ointment 1 Application(s) Topical two times a day  lidocaine   4% Patch 1 Patch Transdermal daily  pregabalin 75 milliGRAM(s) Oral two times a day      Allergies    cephalexin (Hives)  oral contraceptives (Anaphylaxis)    Intolerances        PAST MEDICAL & SURGICAL HISTORY:  Diabetes    PCOS (polycystic ovarian syndrome)    Pre-diabetes     (normal spontaneous vaginal delivery)    DDD (degenerative disc disease), lumbar    Renal calculi  left- non obstructing    Obesity    No significant past surgical history    H/O induced     S/P dilation and curettage  missed                                14.1   7.62  )-----------( 212      ( 16 May 2023 07:45 )             42.9       16 May 2023 07:45    140    |  105    |  17     ----------------------------<  91     4.4     |  21     |  0.62     Ca    9.2        16 May 2023 07:45  Phos  3.0       16 May 2023 07:45  Mg     2.00      16 May 2023 07:45                Vital Signs Last 24 Hrs  T(C): 36.7 (23 @ 14:26), Max: 36.8 (23 @ 07:30)  T(F): 98 (23 @ 14:26), Max: 98.2 (23 @ 07:30)  HR: 79 (23 @ 14:26) (79 - 82)  BP: 135/89 (23 @ 14:26) (135/89 - 146/94)  BP(mean): --  RR: 18 (23 @ 14:26) (18 - 18)  SpO2: 100% (23 @ 14:26) (100% - 100%)    Gen: NAD    Spine PE:  Skin intact  No gross deformity  No midline TTP C/T/L/S spine  No bony step offs  No paraspinal muscle ttp/hypertonicity  No saddle anesthesia  Positive SLR left side             Deltoid        Bicep        Tricep          Wrist Ext    Wrist Flex    Finger Flex          Finger Abd  R            5/5          5/5           5/5              5/5                5/5	           5/5                         5/5  L             5/5           5/5          5/5              5/5                5/5                   5/5                        5/5                Hip Flex       Quad     Ankle DF        Ankle PF       Toe Ext        Hamstring    R            4/5              4/5          4/5                 4/5                 4/5	                4/5  L            4/5              4/5           4/5                 4/5                 4/5                4/5    Exam limited by Effort and Pain    Sensory:            C5         C6         C7      C8       T1        (0=absent, 1=impaired, 2=normal, NT=not testable)  R         2            2           2        2         2  L          2            2           2        2         2               L2          L3         L4      L5       S1         (0=absent, 1=impaired, 2=normal, NT=not testable)  R         2            2            2        2        2  L          2            2           2        2         2      Imaging: MRI C/T/L Spine  < from: MR Lumbar Spine No Cont (05.15.23 @ 19:01) >    IMPRESSION:    1. CERVICAL SPINE:   Reversal of the normal cervical lordosis suggestive   of disc pathology and / or muscle spasm. No significant disc pathology.   Cervical cord intact.    2. THORACIC SPINE:   No significant disc pathology. Thoracic cord contact   or    3. LUMBAR SPINE:   L3-L4 shallow right central annular fissure  (disc   herniation).  L4-L5 mild diffuse disc bulge with superimposed right   central disc protrusion  (disc herniation).   L5-S1 leftcentral disc   extrusion  (disc herniation)  displaces the left S1 nerve root in the   ventral lateral recess of the canal    --- End of Report ---    < end of copied text >

## 2023-05-16 NOTE — CONSULT NOTE ADULT - ASSESSMENT
A/P: 33y Female with lumbar radiculitis    ***Plan pending*******    -Pain control  -MRI reviewed  -WBAT with assistive devices as needed  -Above to be reviewed with ortho spine attending Dr. Raymond    A/P: 33y Female with lumbar radiculitis, MRI findings consistent for L5-S1 extruded disc with L nerve root compression    -Pain control  -DVT ppx per primary team  -PT /OT  -MRI reviewed  -WBAT with assistive devices as needed  -No acute orthopedic surgical intervention at this time.   -Please follow up with Dr. Raymond in 1 week after hospital discharge.  -Above reviewed with Dr. Raymond ortho spine surgeon who is in agreement with the plan.  -Please notify ortho with any further questions.

## 2023-05-16 NOTE — PROGRESS NOTE ADULT - NSPROGADDITIONALINFOA_GEN_ALL_CORE
awaiting MRI   discussed with patient in detail, expresses understanding of treatment plans.
discussed with patient in detail, expresses understanding of treatment plans.  discussed with covering ACP

## 2023-05-17 ENCOUNTER — TRANSCRIPTION ENCOUNTER (OUTPATIENT)
Age: 33
End: 2023-05-17

## 2023-05-17 VITALS
HEART RATE: 92 BPM | SYSTOLIC BLOOD PRESSURE: 142 MMHG | OXYGEN SATURATION: 97 % | TEMPERATURE: 98 F | RESPIRATION RATE: 18 BRPM | DIASTOLIC BLOOD PRESSURE: 91 MMHG

## 2023-05-17 RX ORDER — METHOCARBAMOL 500 MG/1
1 TABLET, FILM COATED ORAL
Qty: 12 | Refills: 0
Start: 2023-05-17 | End: 2023-05-20

## 2023-05-17 RX ORDER — LIDOCAINE 4 G/100G
1 CREAM TOPICAL
Qty: 7 | Refills: 0
Start: 2023-05-17 | End: 2023-05-23

## 2023-05-17 RX ORDER — OXYCODONE HYDROCHLORIDE 5 MG/1
1 TABLET ORAL
Qty: 28 | Refills: 0
Start: 2023-05-17 | End: 2023-05-23

## 2023-05-17 RX ORDER — HYDROCORTISONE 1 %
1 OINTMENT (GRAM) TOPICAL
Qty: 0 | Refills: 0 | DISCHARGE
Start: 2023-05-17

## 2023-05-17 RX ADMIN — Medication 4 MILLIGRAM(S): at 06:34

## 2023-05-17 RX ADMIN — Medication 1 APPLICATION(S): at 06:34

## 2023-05-17 RX ADMIN — Medication 4 MILLIGRAM(S): at 01:49

## 2023-05-17 RX ADMIN — Medication 3 MILLIGRAM(S): at 12:50

## 2023-05-17 RX ADMIN — Medication 75 MILLIGRAM(S): at 06:33

## 2023-05-17 RX ADMIN — OXYCODONE HYDROCHLORIDE 10 MILLIGRAM(S): 5 TABLET ORAL at 01:49

## 2023-05-17 RX ADMIN — Medication 650 MILLIGRAM(S): at 12:49

## 2023-05-17 RX ADMIN — OXYCODONE HYDROCHLORIDE 10 MILLIGRAM(S): 5 TABLET ORAL at 07:03

## 2023-05-17 RX ADMIN — METHOCARBAMOL 750 MILLIGRAM(S): 500 TABLET, FILM COATED ORAL at 01:49

## 2023-05-17 RX ADMIN — OXYCODONE HYDROCHLORIDE 10 MILLIGRAM(S): 5 TABLET ORAL at 02:19

## 2023-05-17 RX ADMIN — OXYCODONE HYDROCHLORIDE 10 MILLIGRAM(S): 5 TABLET ORAL at 06:33

## 2023-05-17 NOTE — PROGRESS NOTE ADULT - PROBLEM SELECTOR PLAN 1
still in pain  MR spine noted  ortho spine consultation in progress  start dexamethasone IV  continue to follow Neurosurgery recommendations
ortho spine consultation appreciated  no intervention at this time  outpatient follow up Dr Raymond  likely discharge on medrol dose amelia
Patient presenting with acute on chronic onset of lower back pain with radiation to LE's  - also with intermittent new b/l hand numbness  - patient with symptoms appearing to be associated with movement and periodic muscle spasms  - no saddle anesthesia, no urinary/bowel incontinence, low concern for acute cord compression  - will obtain MR imaging of C/T/L spine to further investigate (ordered 5/14/23), patient may require anxiolytic medication prior to MRI as she stated she had some anxiety with prior studies  - plan to begin pain control regimen with toradol, baclofen, lidocaine patch, oxycodone PRN and morphine PRN  - PT eval  - would consider NSG input regarding utility for steroids once imaging obtained

## 2023-05-17 NOTE — PROGRESS NOTE ADULT - PROBLEM SELECTOR PLAN 5
DVT: lovenox  Diet: Regular  Dispo: pending PT eval, pain control

## 2023-05-17 NOTE — DISCHARGE NOTE NURSING/CASE MANAGEMENT/SOCIAL WORK - NSDCPEWEB_GEN_ALL_CORE
[facility] for Tobacco Control website --- [email or web address] --- [email or web address]

## 2023-05-17 NOTE — DISCHARGE NOTE NURSING/CASE MANAGEMENT/SOCIAL WORK - NSDCPEEMAIL_GEN_ALL_CORE
[facility] for Tobacco Control email [email or web address]

## 2023-05-17 NOTE — DISCHARGE NOTE PROVIDER - CARE PROVIDER_API CALL
ricardo bryant  Please follow up with your primary doctor in 1 week , call for appointment  Phone: (   )    -  Fax: (   )    -  Follow Up Time:     Finn Raymond)  Orthopaedic Surgery  [address], [address]  [place] [address]  Phone: [phone]  Fax: [phone]  Follow Up Time: 1 week

## 2023-05-17 NOTE — PROGRESS NOTE ADULT - PROBLEM SELECTOR PLAN 4
Patient with reported history of prediabetes, previously on metformin but self-d/c'd  - BG currently appearing WNL  HbA1C normal
Patient with reported history of prediabetes, previously on metformin but self-d/c'd  - BG currently appearing WNL  - check A1C in AM
Patient with reported history of prediabetes, previously on metformin but self-d/c'd  - BG currently appearing WNL  HbA1C normal

## 2023-05-17 NOTE — DISCHARGE NOTE PROVIDER - PROVIDER TOKENS
FREE:[LAST:[annette],FIRST:[ricardo],PHONE:[(   )    -],FAX:[(   )    -],ADDRESS:[Please follow up with your primary doctor in 1 week , call for appointment]],PROVIDER:[TOKEN:[[id number]],FOLLOWUP:[1 week]]

## 2023-05-17 NOTE — PROGRESS NOTE ADULT - SUBJECTIVE AND OBJECTIVE BOX
Patient is a 33y old  Female who presents with a chief complaint of Lower Back Pain (16 May 2023 15:22)      DATE OF SERVICE: 05-17-23 @ 11:31    SUBJECTIVE / OVERNIGHT EVENTS: overnight events noted    ROS:  Resp: No cough no sputum production  CVS: No chest pain no palpitations no orthopnea  GI: no N/V/D  : no dysuria, no hematuria          MEDICATIONS  (STANDING):  acetaminophen     Tablet .. 650 milliGRAM(s) Oral every 6 hours  dexAMETHasone  Injectable 4 milliGRAM(s) IV Push every 6 hours  hydrocortisone 2.5% Ointment 1 Application(s) Topical two times a day  lidocaine   4% Patch 1 Patch Transdermal daily  pregabalin 75 milliGRAM(s) Oral two times a day    MEDICATIONS  (PRN):  aluminum hydroxide/magnesium hydroxide/simethicone Suspension 30 milliLiter(s) Oral every 4 hours PRN Dyspepsia  melatonin 3 milliGRAM(s) Oral at bedtime PRN Insomnia  methocarbamol 750 milliGRAM(s) Oral every 8 hours PRN Muscle Spasm  oxyCODONE    IR 10 milliGRAM(s) Oral every 4 hours PRN Moderate Pain (4 - 6)        CAPILLARY BLOOD GLUCOSE        I&O's Summary      Vital Signs Last 24 Hrs  T(C): 36.6 (17 May 2023 09:34), Max: 36.9 (16 May 2023 18:00)  T(F): 97.8 (17 May 2023 09:34), Max: 98.4 (16 May 2023 18:00)  HR: 92 (17 May 2023 09:34) (79 - 99)  BP: 142/91 (17 May 2023 09:34) (135/89 - 150/108)  BP(mean): --  RR: 18 (17 May 2023 09:34) (18 - 18)  SpO2: 97% (17 May 2023 09:34) (96% - 100%)    PHYSICAL EXAM:  GENERAL: in no apparent distress  HEAD:  Atraumatic, Normocephalic  EYES: EOMI, PERRLA, sclera clear  NECK: Supple, No JVD  CHEST/LUNG: clear b/l, no wheeze  HEART: S1 S2; no murmurs appreciated  ABDOMEN: Soft, Nontender, Bowel sounds present  EXTREMITIES:  No clubbing or cyanosis,  no edema  NEUROLOGY: AO x 3 non-focal  SKIN: No rashes or lesions    LABS:                        14.1   7.62  )-----------( 212      ( 16 May 2023 07:45 )             42.9     05-16    140  |  105  |  17  ----------------------------<  91  4.4   |  21<L>  |  0.62    Ca    9.2      16 May 2023 07:45  Phos  3.0     05-16  Mg     2.00     05-16                  All consultant(s) notes reviewed and care discussed with other providers        Contact Number, Dr Zhu [phone]
Patient is a 33y old  Female who presents with a chief complaint of Lower Back Pain (14 May 2023 10:25)  patient not known to me, assigned this AM to assume care  chart reviewed and events thus far noted  admitted overnight by hospitalist colleague   MARY shelley    DATE OF SERVICE: 05-15-23 @ 11:49    SUBJECTIVE / OVERNIGHT EVENTS: overnight events noted    ROS:  Resp: No cough no sputum production  CVS: No chest pain no palpitations no orthopnea  GI: no N/V/D  : no dysuria, no hematuria  Neuro: no weakness no paresthesias  Heme: No petechiae no easy bruising  Msk: continue back pain  Skin: No rash no itching        MEDICATIONS  (STANDING):  hydrocortisone 2.5% Ointment 1 Application(s) Topical two times a day  ketorolac   Injectable 30 milliGRAM(s) IV Push every 8 hours  lidocaine   4% Patch 1 Patch Transdermal daily  methocarbamol 750 milliGRAM(s) Oral every 8 hours    MEDICATIONS  (PRN):  acetaminophen     Tablet .. 650 milliGRAM(s) Oral every 6 hours PRN Temp greater or equal to 38C (100.4F), Mild Pain (1 - 3)  aluminum hydroxide/magnesium hydroxide/simethicone Suspension 30 milliLiter(s) Oral every 4 hours PRN Dyspepsia  LORazepam     Tablet 0.5 milliGRAM(s) Oral once PRN Anxiety  melatonin 3 milliGRAM(s) Oral at bedtime PRN Insomnia  morphine  - Injectable 2 milliGRAM(s) IV Push every 4 hours PRN Severe Pain (7 - 10)  oxyCODONE    IR 10 milliGRAM(s) Oral every 6 hours PRN Moderate Pain (4 - 6)        CAPILLARY BLOOD GLUCOSE        I&O's Summary      Vital Signs Last 24 Hrs  T(C): 36.7 (15 May 2023 10:48), Max: 37.1 (14 May 2023 18:40)  T(F): 98 (15 May 2023 10:48), Max: 98.7 (14 May 2023 18:40)  HR: 92 (15 May 2023 10:48) (72 - 92)  BP: 144/95 (15 May 2023 10:48) (124/90 - 144/95)  BP(mean): --  RR: 20 (15 May 2023 10:48) (17 - 20)  SpO2: 92% (15 May 2023 10:48) (92% - 98%)    PHYSICAL EXAM:  GENERAL: in no apparent distress  HEAD:  Atraumatic, Normocephalic  EYES: EOMI, PERRLA, sclera clear  NECK: Supple, No JVD  CHEST/LUNG: clear b/l, no wheeze  HEART: S1 S2; no murmurs appreciated  ABDOMEN: Soft, Nontender, Bowel sounds present  EXTREMITIES:  No clubbing or cyanosis,  no edema  NEUROLOGY: AO x 3 non-focal  SKIN: No rashes or lesions    LABS:                        14.6   9.00  )-----------( 252      ( 15 May 2023 06:11 )             45.8     05-15    138  |  105  |  14  ----------------------------<  103<H>  4.4   |  24  |  0.72    Ca    9.2      15 May 2023 06:11  Phos  3.1     05-15  Mg     2.00     05-15    TPro  6.9  /  Alb  4.3  /  TBili  0.8  /  DBili  x   /  AST  16  /  ALT  15  /  AlkPhos  60  05-14          Urinalysis Basic - ( 14 May 2023 00:46 )    Color: Light Yellow / Appearance: Slightly Turbid / S.024 / pH: x  Gluc: x / Ketone: Negative  / Bili: Negative / Urobili: <2 mg/dL   Blood: x / Protein: Trace / Nitrite: Negative   Leuk Esterase: Negative / RBC: 4 /HPF / WBC 2 /HPF   Sq Epi: x / Non Sq Epi: x / Bacteria: Occasional          All consultant(s) notes reviewed and care discussed with other providers        Contact Number, Dr Zhu [phone]
Patient is a 33y old  Female who presents with a chief complaint of Lower Back Pain (16 May 2023 14:38)      DATE OF SERVICE: 05-16-23 @ 15:22    SUBJECTIVE / OVERNIGHT EVENTS: overnight events noted    ROS:  Resp: No cough no sputum production  CVS: No chest pain no palpitations no orthopnea  GI: no N/V/D  : no dysuria, no hematuria  Neuro: no weakness no paresthesias  Heme: No petechiae no easy bruising  Msk: still with considerable low back pain        MEDICATIONS  (STANDING):  acetaminophen     Tablet .. 650 milliGRAM(s) Oral every 6 hours  dexAMETHasone  Injectable 4 milliGRAM(s) IV Push every 6 hours  hydrocortisone 2.5% Ointment 1 Application(s) Topical two times a day  lidocaine   4% Patch 1 Patch Transdermal daily  pregabalin 75 milliGRAM(s) Oral two times a day    MEDICATIONS  (PRN):  aluminum hydroxide/magnesium hydroxide/simethicone Suspension 30 milliLiter(s) Oral every 4 hours PRN Dyspepsia  melatonin 3 milliGRAM(s) Oral at bedtime PRN Insomnia  oxyCODONE    IR 10 milliGRAM(s) Oral every 4 hours PRN Moderate Pain (4 - 6)        CAPILLARY BLOOD GLUCOSE        I&O's Summary      Vital Signs Last 24 Hrs  T(C): 36.7 (16 May 2023 14:26), Max: 36.8 (16 May 2023 07:30)  T(F): 98 (16 May 2023 14:26), Max: 98.2 (16 May 2023 07:30)  HR: 79 (16 May 2023 14:26) (79 - 82)  BP: 135/89 (16 May 2023 14:26) (135/89 - 146/94)  BP(mean): --  RR: 18 (16 May 2023 14:26) (18 - 18)  SpO2: 100% (16 May 2023 14:26) (100% - 100%)    PHYSICAL EXAM:  GENERAL: in no apparent distress  HEAD:  Atraumatic, Normocephalic  EYES: EOMI, PERRLA, sclera clear  NECK: Supple, No JVD  CHEST/LUNG: clear b/l, no wheeze  HEART: S1 S2; no murmurs appreciated  ABDOMEN: Soft, Nontender, Bowel sounds present  EXTREMITIES:  No clubbing or cyanosis,  no edema  NEUROLOGY: AO x 3 non-focal  SKIN: No rashes or lesions    LABS:                        14.1   7.62  )-----------( 212      ( 16 May 2023 07:45 )             42.9     05-16    140  |  105  |  17  ----------------------------<  91  4.4   |  21<L>  |  0.62    Ca    9.2      16 May 2023 07:45  Phos  3.0     05-16  Mg     2.00     05-16                  All consultant(s) notes reviewed and care discussed with other providers        Contact Number, Dr Zhu [phone]

## 2023-05-17 NOTE — DISCHARGE NOTE PROVIDER - HOSPITAL COURSE
Problem/Plan - 1:  ·  Problem: Pain of back and lower extremity.   ·  Plan: ortho spine consultation appreciated  no intervention at this time  outpatient follow up Dr Raymond  likely discharge on medrol dose amelia.     Problem/Plan - 2:  ·  Problem: Nephrolithiasis.   ·  Plan: Patient with recent episode of nephrolithiasis s/p lithotripsy  outpatient follow up urology.     Problem/Plan - 3:  ·  Problem: Hand eczema.   ·  Plan: Patient noted with small scaly plague over 2nd digit MCP on left hand  resolving.     Problem/Plan - 4:  ·  Problem: Prediabetes.   ·  Plan: Patient with reported history of prediabetes, previously on metformin but self-d/c'd  - BG currently appearing WNL  HbA1C normal.  On 5/17/2023  this case was reviewed with Dr. Zhu,  the patient is medically stable and optimized for discharge. All medications were reviewed and prescriptions were sent to mutually agreed upon pharmacy.

## 2023-05-17 NOTE — PROGRESS NOTE ADULT - ASSESSMENT
32 y/o F with PCOS, herniated disc, and nephrolithiasis s/p lithotripsy now presenting with 2 weeks of worsening lower back pain and b/l LE pain. Admitted to medicine for further workup and pain management.
34 y/o F with PCOS, herniated disc, and nephrolithiasis s/p lithotripsy now presenting with 2 weeks of worsening lower back pain and b/l LE pain. Admitted to medicine for further workup and pain management.
34 y/o F with PCOS, herniated disc, and nephrolithiasis s/p lithotripsy now presenting with 2 weeks of worsening lower back pain and b/l LE pain. Admitted to medicine for further workup and pain management.

## 2023-05-17 NOTE — DISCHARGE NOTE PROVIDER - NSDCCPCAREPLAN_GEN_ALL_CORE_FT
PRINCIPAL DISCHARGE DIAGNOSIS  Diagnosis: Pain of back and lower extremity  Assessment and Plan of Treatment: Do not stay in bed. Resting more than 1 or 2 days can delay your recovery.  Do not avoid exercise or work. Your body is made to move. It is not dangerous to be active, even though your back may hurt. Your back will likely heal faster if you return to being active before your pain is gone.   Only take over-the-counter or prescription medicines as directed by your caregiver. Over-the-counter medicines to reduce pain and inflammation are often the most helpful.  Your caregiver may prescribe muscle relaxant drugs. These medicines help dull your pain so you can more quickly return to your normal activities and healthy exercise.  Avoid feeling anxious or stressed. Stress increases muscle tension and can worsen back pain. It is important to recognize when you are anxious or stressed and learn ways to manage it. Exercise is a great option.  SEEK MEDICAL CARE IF:You have pain that is not relieved with rest or medicine.  You have pain that does not improve in 1 week.  You have new symptoms.  You are generally not feeling well.  You have pain that radiates from your back into your legs.  You develop new bowel or bladder control problems.  You have unusual weakness or numbness in your arms or legs.  You develop nausea or vomiting.  You develop abdominal pain.  You feel faint  Follow up with Dr. Raymond in 1 week         SECONDARY DISCHARGE DIAGNOSES  Diagnosis: Prediabetes  Assessment and Plan of Treatment: Continue diet modification. Avoid complex carbohydrates such as bread, pasta, cereal, white rice, white potatoes, etc. Avoid concentrated sugar as found in desserts, candy, soda, juice, etc. Consume a diet based on lean protein (chicken, fish) and vegetables.Follow up with your primary doctor if needed.       Diagnosis: Nephrolithiasis  Assessment and Plan of Treatment: Follow up with your urology as needed, you had recent klithotripsy that is also painful . Monitor for signs/symptoms of urine infection, do not hold urine such as, fever/chills, burning/pain with urination, urinary frequency/hesitancy, cloudy urine, or blood in urine.

## 2023-05-17 NOTE — PROGRESS NOTE ADULT - PROBLEM SELECTOR PLAN 2
Patient with recent episode of nephrolithiasis s/p lithotripsy  outpatient follow up urology
Patient with recent episode of nephrolithiasis s/p lithotripsy  - recent CT stone hunt performed showing only punctate non-obstructing stones bilaterally  - patient urinating without issue currently  - will encourage PO hydration
Patient with recent episode of nephrolithiasis s/p lithotripsy  - recent CT stone hunt performed showing only punctate non-obstructing stones bilaterally  - patient urinating without issue currently  - will encourage PO hydration

## 2023-05-17 NOTE — DISCHARGE NOTE PROVIDER - NSDCMRMEDTOKEN_GEN_ALL_CORE_FT
aluminum hydroxide-magnesium hydroxide 200 mg-200 mg/5 mL oral suspension: 30 milliliter(s) orally every 4 hours As needed Dyspepsia  hydrocortisone 2.5% topical ointment: 1 Apply topically to affected area 2 times a day  lidocaine 4% topical film: Apply topically to affected area every 24 hours apply every 24 hours for 12 hours  Medrol Dosepak 4 mg oral tablet: 1 tab(s) orally once a day take as directed on the package with tapering, take with food  methocarbamol 750 mg oral tablet: 1 tab(s) orally every 8 hours as needed for Muscle Spasm MDD: 3  oxyCODONE 10 mg oral tablet: 1 tab(s) orally every 6 hours as needed for  severe pain ( 7-10) MDD: 4

## 2023-05-17 NOTE — PROGRESS NOTE ADULT - PROBLEM SELECTOR PLAN 3
Patient noted with small scaly plague over 2nd digit MCP on left hand  - noted to be pruritic  - most likely eczema/atopic dermatitis  - patient reports no improvement with eucerin lotion  - will trial hydrocortisone ointment
Patient noted with small scaly plague over 2nd digit MCP on left hand  resolving
Patient noted with small scaly plague over 2nd digit MCP on left hand  - noted to be pruritic  resolving

## 2023-05-17 NOTE — DISCHARGE NOTE PROVIDER - NSDCFUADDAPPT_GEN_ALL_CORE_FT
Please follow up with your primary doctor in 1 week , call for appointment and in 1 week with dr. Raymond , spine specialist

## 2023-05-22 ENCOUNTER — APPOINTMENT (OUTPATIENT)
Dept: ORTHOPEDIC SURGERY | Facility: CLINIC | Age: 33
End: 2023-05-22

## 2023-08-22 NOTE — ED ADULT NURSE NOTE - NSFALLUNIVINTERV_ED_ALL_ED
[id number]
Bed/Stretcher in lowest position, wheels locked, appropriate side rails in place/Call bell, personal items and telephone in reach/Instruct patient to call for assistance before getting out of bed/chair/stretcher/Non-slip footwear applied when patient is off stretcher/[place] to call system/Physically safe environment - no spills, clutter or unnecessary equipment/Purposeful proactive rounding/Room/bathroom lighting operational, light cord in reach

## 2024-03-09 NOTE — PATIENT PROFILE ADULT - IS PATIENT PREGNANT?
Last DFE 1/30/24.       Primary open angle glaucoma of right eye, moderate [id number]  Primary open angle glaucoma of left eye, mild [id number]  -(+)FH glaucoma - brother  -Pachymetry - 500/497  -OCT RNFL (1/30/24) - OD: Thin S/I. OS: Thin ST. 62/79. Mild progression compared to 8/30/22.   -HVF 24-2 (3/21/23) - OD: 5/16FL 24%FP 37%FN. Peripheral depression, but WNL. OS: 3/15FL 6%FP 10%FN. Peripheral depression, but WNL. Unreliable OU. Similar to 9/19/18.   -Plan: Disc appearance stable. Reports good compliance with drops. Patient with mild increase in IOP s/p steroid injection in back on 3/20/21 (dexamethasone, last injection was methylprednisolone 2/20/23). Per patient, she is no longer getting steroid injections.  -Patient was on Timolol 0.25% OU BID. Added Brimonidine 0.2% OU BID (3/21/23). IOP higher than ideal with mild progression on OCT RNFL. Would recommend continuing Brimonidine OU BID. Stopped Timolol (1/30/24), and started dorzolamide-timolol (1/30/24) OU BID. IOP improved. Discussed eye drop administration device to help with squeezing bottle due to arthritis. History of putting brimonidine bottle in hot water to make it easier to squeeze, recommended not doing this due to risk of altering medication/contamination, etc.   -F/u 6 months IOP check and HVF 24-2.     Diabetes [id number]  -HbA1c= 5.9 (7/1/22). No diabetic retinopathy seen on last dilated exam. Continue close monitoring of blood glucose, blood pressure, and cholesterol. Plan for annual dilated eye exam.    Pseudophakia of both eyesZ96.1  -Stable. Monitor.     Meibomian gland dysfunction (MGD) of upper and lower lids of both eyesH01.001  -Continue warm compresses and artificial tears PRN    TakejdC89.10  CluqbarykhiG61.209  Presbyopia  -Continue OTC reading glasses. Declines distance Rx at this time.   
no

## 2024-07-22 NOTE — PATIENT PROFILE ADULT - ARE SIGNIFICANT INDICATORS COMPLETE.
Arleen Zarate is a 89-year-old female who presents emergency room complaining of left hip pain. She sustained a mechanical fall at her residence. She reports frequent falls. She fell several months ago and sustained a right hip fracture which required surgical repair. Pain is worse with any movement of the left leg. No alleviating factors. No recent sick contacts or travel. No fever or chills. Previous medical history includes hypertension, GERD, hyperlipidemia, anxiety. ER workup: CBC unremarkable. CMP unremarkable. X-ray of the left hip demonstrates a left femoral neck fracture.    Yes

## 2025-04-19 NOTE — H&P PST ADULT - DENTITION
Chest x-ray actually looks a little better symptomatically she is a little better she is having no fever although steroids may mass set white counts up due to steroids.  Antibiotics have been stopped will switch to lower doses steroids tomorrow thing maybe improved   No. RAMIRO screening performed.  STOP BANG Legend: 0-2 = LOW Risk; 3-4 = INTERMEDIATE Risk; 5-8 = HIGH Risk normal

## 2025-07-21 NOTE — ED ADULT NURSE NOTE - NS ED NOTE  TALK SOMEONE YN
[employer], arch supports. [facility] shoes, [employer]. Consider [employer] tennis shoes    [address] [address]:  [address], [place] [address]      Mules/clogs (open back) keyshawn    No